# Patient Record
Sex: FEMALE | Race: WHITE | Employment: OTHER | URBAN - NONMETROPOLITAN AREA
[De-identification: names, ages, dates, MRNs, and addresses within clinical notes are randomized per-mention and may not be internally consistent; named-entity substitution may affect disease eponyms.]

---

## 2017-06-27 ENCOUNTER — DOCTOR'S OFFICE (OUTPATIENT)
Dept: URBAN - NONMETROPOLITAN AREA CLINIC 1 | Facility: CLINIC | Age: 82
Setting detail: OPHTHALMOLOGY
End: 2017-06-27
Payer: MEDICARE

## 2017-06-27 DIAGNOSIS — H02.403: ICD-10-CM

## 2017-06-27 DIAGNOSIS — Z96.1: ICD-10-CM

## 2017-06-27 DIAGNOSIS — H04.122: ICD-10-CM

## 2017-06-27 DIAGNOSIS — H01.004: ICD-10-CM

## 2017-06-27 DIAGNOSIS — H35.3131: ICD-10-CM

## 2017-06-27 DIAGNOSIS — H04.121: ICD-10-CM

## 2017-06-27 DIAGNOSIS — H01.001: ICD-10-CM

## 2017-06-27 PROCEDURE — 92014 COMPRE OPH EXAM EST PT 1/>: CPT | Performed by: OPHTHALMOLOGY

## 2017-06-27 PROCEDURE — 92134 CPTRZ OPH DX IMG PST SGM RTA: CPT | Performed by: OPHTHALMOLOGY

## 2017-06-27 PROCEDURE — 83861 MICROFLUID ANALY TEARS: CPT | Performed by: OPHTHALMOLOGY

## 2017-06-27 ASSESSMENT — REFRACTION_MANIFEST
OS_VA3: 20/
OS_VA1: 20/
OS_VA3: 20/
OS_VA1: 20/
OD_SPHERE: -0.50
OD_VA1: 20/
OD_AXIS: 095
OD_ADD: +2.75
OS_VA2: 20/
OS_VA1: 20/40
OD_VA3: 20/
OD_VA2: 20/
OS_ADD: +2.75
OS_CYLINDER: -0.50
OU_VA: 20/
OD_CYLINDER: -2.50
OD_VA1: 20/
OU_VA: 20/
OS_VA2: 20/40
OS_VA2: 20/
OD_VA1: 20/50+2
OU_VA: 20/
OS_VA3: 20/
OD_VA2: 20/40-2
OD_VA3: 20/
OD_VA2: 20/
OS_AXIS: 085
OD_VA3: 20/
OS_SPHERE: -0.75

## 2017-06-27 ASSESSMENT — REFRACTION_AUTOREFRACTION
OS_AXIS: 084
OD_AXIS: 95
OS_CYLINDER: -1.50
OD_CYLINDER: -2.50
OS_SPHERE: -1.75

## 2017-06-27 ASSESSMENT — LID EXAM ASSESSMENTS
OS_BLEPHARITIS: POSTERIOR
OS_TRICHIASIS: LLL 1+
OD_BLEPHARITIS: POSTERIOR

## 2017-06-27 ASSESSMENT — REFRACTION_CURRENTRX
OS_OVR_VA: 20/
OD_VPRISM_DIRECTION: PROGS
OD_OVR_VA: 20/
OD_CYLINDER: -2.50
OD_SPHERE: -0.50
OS_VPRISM_DIRECTION: PROGS
OS_CYLINDER: -0.50
OS_ADD: +2.75
OS_OVR_VA: 20/
OD_ADD: +2.75
OD_OVR_VA: 20/
OD_AXIS: 96
OS_SPHERE: -0.50
OD_OVR_VA: 20/
OS_OVR_VA: 20/
OS_AXIS: 89

## 2017-06-27 ASSESSMENT — DRY EYES - PHYSICIAN NOTES
OS_GENERALCOMMENTS: SEVERE KSICCA
OD_GENERALCOMMENTS: SEVERE KSICCA

## 2017-06-27 ASSESSMENT — CONFRONTATIONAL VISUAL FIELD TEST (CVF)
OS_FINDINGS: FULL
OD_FINDINGS: FULL

## 2017-06-27 ASSESSMENT — SPHEQUIV_DERIVED
OS_SPHEQUIV: -2.5
OS_SPHEQUIV: -1
OD_SPHEQUIV: -1.75

## 2017-06-27 ASSESSMENT — SUPERFICIAL PUNCTATE KERATITIS (SPK)
OS_SPK: T
OD_SPK: T

## 2017-06-27 ASSESSMENT — CORNEAL DYSTROPHY
OD_DYSTROPHY: MDF
OS_DYSTROPHY: MDF

## 2017-06-27 ASSESSMENT — VISUAL ACUITY
OS_BCVA: 20/30-1
OD_BCVA: 20/30-1

## 2017-06-27 ASSESSMENT — PUNCTA - ASSESSMENT
OD_PUNCTA: SIL PLUG RLL MED
OS_PUNCTA: SIL PLUG LLL MED

## 2018-02-21 ENCOUNTER — DOCTOR'S OFFICE (OUTPATIENT)
Dept: URBAN - NONMETROPOLITAN AREA CLINIC 1 | Facility: CLINIC | Age: 83
Setting detail: OPHTHALMOLOGY
End: 2018-02-21
Payer: MEDICARE

## 2018-02-21 ENCOUNTER — OPTICAL OFFICE (OUTPATIENT)
Dept: URBAN - NONMETROPOLITAN AREA CLINIC 4 | Facility: CLINIC | Age: 83
Setting detail: OPHTHALMOLOGY
End: 2018-02-21

## 2018-02-21 DIAGNOSIS — H02.403: ICD-10-CM

## 2018-02-21 DIAGNOSIS — H04.121: ICD-10-CM

## 2018-02-21 DIAGNOSIS — H52.223: ICD-10-CM

## 2018-02-21 DIAGNOSIS — H52.4: ICD-10-CM

## 2018-02-21 DIAGNOSIS — H04.122: ICD-10-CM

## 2018-02-21 DIAGNOSIS — H35.3131: ICD-10-CM

## 2018-02-21 DIAGNOSIS — Z96.1: ICD-10-CM

## 2018-02-21 DIAGNOSIS — H52.03: ICD-10-CM

## 2018-02-21 PROCEDURE — 92015 DETERMINE REFRACTIVE STATE: CPT | Performed by: OPTOMETRIST

## 2018-02-21 PROCEDURE — V2020 VISION SVCS FRAMES PURCHASES: HCPCS | Performed by: OPTOMETRIST

## 2018-02-21 PROCEDURE — V2104 SPHEROCYLINDR 4.00D/2.12-4D: HCPCS | Performed by: OPTOMETRIST

## 2018-02-21 PROCEDURE — 83861 MICROFLUID ANALY TEARS: CPT | Performed by: OPTOMETRIST

## 2018-02-21 PROCEDURE — 92014 COMPRE OPH EXAM EST PT 1/>: CPT | Performed by: OPTOMETRIST

## 2018-02-21 PROCEDURE — 92134 CPTRZ OPH DX IMG PST SGM RTA: CPT | Performed by: OPTOMETRIST

## 2018-02-21 ASSESSMENT — SUPERFICIAL PUNCTATE KERATITIS (SPK)
OD_SPK: T
OS_SPK: T

## 2018-02-21 ASSESSMENT — REFRACTION_CURRENTRX
OS_VPRISM_DIRECTION: PROGS
OS_OVR_VA: 20/
OS_OVR_VA: 20/
OD_VPRISM_DIRECTION: PROGS
OD_OVR_VA: 20/
OS_ADD: +2.75
OS_CYLINDER: -0.50
OS_SPHERE: -0.50
OD_ADD: +2.75
OD_OVR_VA: 20/
OD_AXIS: 96
OD_SPHERE: -0.50
OS_AXIS: 89
OD_CYLINDER: -2.50
OS_OVR_VA: 20/
OD_OVR_VA: 20/

## 2018-02-21 ASSESSMENT — REFRACTION_AUTOREFRACTION
OD_AXIS: 098
OS_SPHERE: -1.75
OD_SPHERE: +0.50
OS_AXIS: 034
OD_CYLINDER: -3.75
OS_CYLINDER: -0.75

## 2018-02-21 ASSESSMENT — SPHEQUIV_DERIVED
OD_SPHEQUIV: -1.375
OD_SPHEQUIV: -1.75
OS_SPHEQUIV: -2.125
OS_SPHEQUIV: -1

## 2018-02-21 ASSESSMENT — REFRACTION_OUTSIDERX
OD_ADD: +3.25
OS_ADD: +3.25
OS_VA2: 20/
OD_CYLINDER: -3.00
OD_AXIS: 095
OS_VA3: 20/
OS_CYLINDER: -0.50
OS_SPHERE: -0.75
OU_VA: 20/50
OD_VA2: 20/
OS_VA1: 20/50
OS_AXIS: 085
OD_VA3: 20/
OD_VA1: 20/50
OD_SPHERE: PLANO

## 2018-02-21 ASSESSMENT — REFRACTION_MANIFEST
OU_VA: 20/
OS_CYLINDER: -0.50
OS_AXIS: 085
OS_VA1: 20/40
OS_VA2: 20/40
OD_VA3: 20/
OD_ADD: +2.75
OS_VA3: 20/
OS_SPHERE: -0.75
OD_AXIS: 095
OD_VA2: 20/40-2
OS_VA2: 20/
OD_VA1: 20/
OD_CYLINDER: -2.50
OD_SPHERE: -0.50
OS_VA1: 20/
OU_VA: 20/
OS_ADD: +2.75
OS_VA3: 20/
OD_VA1: 20/50+2
OD_VA3: 20/
OD_VA2: 20/

## 2018-02-21 ASSESSMENT — PUNCTA - ASSESSMENT
OD_PUNCTA: SIL PLUG RLL
OS_PUNCTA: SIL PLUG LLL

## 2018-02-21 ASSESSMENT — DRY EYES - PHYSICIAN NOTES
OS_GENERALCOMMENTS: KSICCA
OD_GENERALCOMMENTS: KSICCA

## 2018-02-21 ASSESSMENT — VISUAL ACUITY
OD_BCVA: 20/30+1
OS_BCVA: 20/30+1

## 2018-02-21 ASSESSMENT — CONFRONTATIONAL VISUAL FIELD TEST (CVF)
OD_FINDINGS: FULL
OS_FINDINGS: FULL

## 2018-02-21 ASSESSMENT — CORNEAL DYSTROPHY
OS_DYSTROPHY: MDF
OD_DYSTROPHY: MDF

## 2018-09-25 ENCOUNTER — DOCTOR'S OFFICE (OUTPATIENT)
Dept: URBAN - NONMETROPOLITAN AREA CLINIC 1 | Facility: CLINIC | Age: 83
Setting detail: OPHTHALMOLOGY
End: 2018-09-25
Payer: MEDICARE

## 2018-09-25 DIAGNOSIS — H02.403: ICD-10-CM

## 2018-09-25 DIAGNOSIS — H35.3131: ICD-10-CM

## 2018-09-25 DIAGNOSIS — Z96.1: ICD-10-CM

## 2018-09-25 DIAGNOSIS — H04.122: ICD-10-CM

## 2018-09-25 DIAGNOSIS — H04.121: ICD-10-CM

## 2018-09-25 PROCEDURE — 83861 MICROFLUID ANALY TEARS: CPT | Performed by: OPHTHALMOLOGY

## 2018-09-25 PROCEDURE — 92134 CPTRZ OPH DX IMG PST SGM RTA: CPT | Performed by: OPHTHALMOLOGY

## 2018-09-25 PROCEDURE — 92014 COMPRE OPH EXAM EST PT 1/>: CPT | Performed by: OPHTHALMOLOGY

## 2018-09-25 ASSESSMENT — REFRACTION_MANIFEST
OD_SPHERE: PLANO
OS_VA1: 20/40
OD_ADD: +3.25
OS_VA3: 20/
OS_SPHERE: -0.75
OS_CYLINDER: -0.50
OU_VA: 20/
OD_AXIS: 095
OD_AXIS: 095
OD_VA1: 20/50
OD_VA1: 20/50+2
OU_VA: 20/50
OS_VA2: 20/40
OS_VA3: 20/
OS_VA1: 20/50
OD_CYLINDER: -3.00
OS_CYLINDER: -0.50
OD_ADD: +2.75
OD_VA2: 20/40-2
OD_SPHERE: -0.50
OS_AXIS: 085
OS_VA2: 20/
OS_ADD: +2.75
OS_SPHERE: -0.75
OD_VA3: 20/
OS_AXIS: 085
OD_VA2: 20/
OD_CYLINDER: -2.50
OS_ADD: +3.25
OD_VA3: 20/

## 2018-09-25 ASSESSMENT — VISUAL ACUITY
OD_BCVA: 20/30-2
OS_BCVA: 20/30-2

## 2018-09-25 ASSESSMENT — REFRACTION_CURRENTRX
OD_CYLINDER: -2.50
OD_OVR_VA: 20/
OS_OVR_VA: 20/
OD_OVR_VA: 20/
OD_OVR_VA: 20/
OS_OVR_VA: 20/
OS_AXIS: 89
OS_VPRISM_DIRECTION: PROGS
OS_ADD: +2.75
OD_SPHERE: -0.50
OD_VPRISM_DIRECTION: PROGS
OD_AXIS: 96
OS_OVR_VA: 20/
OS_CYLINDER: -0.50
OD_ADD: +2.75
OS_SPHERE: -0.50

## 2018-09-25 ASSESSMENT — CONFRONTATIONAL VISUAL FIELD TEST (CVF)
OD_FINDINGS: FULL
OS_FINDINGS: FULL

## 2018-09-25 ASSESSMENT — PUNCTA - ASSESSMENT
OD_PUNCTA: SIL PLUG RLL
OS_PUNCTA: SIL PLUG LLL

## 2018-09-25 ASSESSMENT — SPHEQUIV_DERIVED
OS_SPHEQUIV: -1
OS_SPHEQUIV: -1
OS_SPHEQUIV: -2.125
OD_SPHEQUIV: -1.375
OD_SPHEQUIV: -1.75

## 2018-09-25 ASSESSMENT — SUPERFICIAL PUNCTATE KERATITIS (SPK)
OS_SPK: T
OD_SPK: T

## 2018-09-25 ASSESSMENT — REFRACTION_AUTOREFRACTION
OS_CYLINDER: -0.75
OD_AXIS: 098
OD_CYLINDER: -3.75
OS_AXIS: 034
OD_SPHERE: +0.50
OS_SPHERE: -1.75

## 2018-09-25 ASSESSMENT — CORNEAL DYSTROPHY
OS_DYSTROPHY: MDF
OD_DYSTROPHY: MDF

## 2018-09-25 ASSESSMENT — DRY EYES - PHYSICIAN NOTES
OS_GENERALCOMMENTS: KSICCA
OD_GENERALCOMMENTS: KSICCA

## 2018-10-16 ENCOUNTER — DOCTOR'S OFFICE (OUTPATIENT)
Dept: URBAN - NONMETROPOLITAN AREA CLINIC 1 | Facility: CLINIC | Age: 83
Setting detail: OPHTHALMOLOGY
End: 2018-10-16

## 2018-10-16 DIAGNOSIS — H52.4: ICD-10-CM

## 2018-10-16 PROCEDURE — 92015 DETERMINE REFRACTIVE STATE: CPT | Performed by: OPTOMETRIST

## 2018-10-16 ASSESSMENT — REFRACTION_AUTOREFRACTION
OD_AXIS: 098
OD_CYLINDER: -3.75
OS_AXIS: 034
OS_CYLINDER: -0.75
OS_SPHERE: -1.75
OD_SPHERE: +0.50

## 2018-10-16 ASSESSMENT — REFRACTION_MANIFEST
OD_VA2: 20/
OD_VA3: 20/
OS_ADD: +2.75
OU_VA: 20/50
OD_SPHERE: PLANO
OU_VA: 20/
OD_VA3: 20/
OD_VA1: 20/50+2
OS_VA3: 20/
OS_VA2: 20/
OD_CYLINDER: -3.00
OS_ADD: +3.25
OD_ADD: +2.75
OS_VA1: 20/40
OS_SPHERE: -0.75
OS_VA1: 20/50
OD_VA1: 20/50
OS_AXIS: 085
OD_AXIS: 095
OS_VA3: 20/
OS_VA2: 20/40
OS_CYLINDER: -0.50
OD_VA2: 20/40-2
OD_AXIS: 095
OD_ADD: +3.25
OD_CYLINDER: -2.50
OS_AXIS: 085
OS_CYLINDER: -0.50
OS_SPHERE: -0.75
OD_SPHERE: -0.50

## 2018-10-16 ASSESSMENT — REFRACTION_CURRENTRX
OD_OVR_VA: 20/
OS_SPHERE: -0.50
OS_AXIS: 89
OD_SPHERE: -0.50
OS_ADD: +2.75
OS_OVR_VA: 20/
OD_OVR_VA: 20/
OS_OVR_VA: 20/
OD_ADD: +2.75
OS_OVR_VA: 20/
OD_CYLINDER: -2.50
OS_CYLINDER: -0.50
OS_VPRISM_DIRECTION: PROGS
OD_AXIS: 96
OD_VPRISM_DIRECTION: PROGS
OD_OVR_VA: 20/

## 2018-10-16 ASSESSMENT — SPHEQUIV_DERIVED
OD_SPHEQUIV: -1.75
OD_SPHEQUIV: -1.375
OS_SPHEQUIV: -1
OS_SPHEQUIV: -1
OS_SPHEQUIV: -2.125

## 2018-10-16 ASSESSMENT — VISUAL ACUITY
OS_BCVA: 20/30+1
OD_BCVA: 20/30

## 2019-03-27 ENCOUNTER — DOCTOR'S OFFICE (OUTPATIENT)
Dept: URBAN - NONMETROPOLITAN AREA CLINIC 1 | Facility: CLINIC | Age: 84
Setting detail: OPHTHALMOLOGY
End: 2019-03-27
Payer: MEDICARE

## 2019-03-27 DIAGNOSIS — Z96.1: ICD-10-CM

## 2019-03-27 DIAGNOSIS — H35.3131: ICD-10-CM

## 2019-03-27 DIAGNOSIS — H04.121: ICD-10-CM

## 2019-03-27 DIAGNOSIS — H02.403: ICD-10-CM

## 2019-03-27 DIAGNOSIS — H04.122: ICD-10-CM

## 2019-03-27 PROCEDURE — 83861 MICROFLUID ANALY TEARS: CPT | Performed by: OPHTHALMOLOGY

## 2019-03-27 PROCEDURE — 92014 COMPRE OPH EXAM EST PT 1/>: CPT | Performed by: OPHTHALMOLOGY

## 2019-03-27 PROCEDURE — 92134 CPTRZ OPH DX IMG PST SGM RTA: CPT | Performed by: OPHTHALMOLOGY

## 2019-03-27 ASSESSMENT — REFRACTION_MANIFEST
OS_SPHERE: -0.75
OS_VA2: 20/
OS_AXIS: 085
OD_AXIS: 095
OS_AXIS: 085
OD_VA3: 20/
OD_ADD: +3.25
OS_SPHERE: -0.75
OS_VA3: 20/
OU_VA: 20/50
OD_AXIS: 095
OD_CYLINDER: -2.50
OU_VA: 20/
OD_CYLINDER: -3.00
OD_VA1: 20/50
OD_VA2: 20/
OS_CYLINDER: -0.50
OD_SPHERE: PLANO
OS_VA2: 20/40
OS_VA1: 20/50
OS_ADD: +2.75
OD_SPHERE: -0.50
OS_ADD: +3.25
OD_ADD: +2.75
OD_VA1: 20/50+2
OS_CYLINDER: -0.50
OS_VA1: 20/40
OS_VA3: 20/
OD_VA2: 20/40-2
OD_VA3: 20/

## 2019-03-27 ASSESSMENT — REFRACTION_CURRENTRX
OD_OVR_VA: 20/
OD_OVR_VA: 20/
OD_SPHERE: -0.75
OD_ADD: +3.00
OD_OVR_VA: 20/
OS_ADD: +3.00
OD_VPRISM_DIRECTION: BF
OS_VPRISM_DIRECTION: BF
OS_OVR_VA: 20/
OS_CYLINDER: -0.50
OD_CYLINDER: -2.50
OS_AXIS: 083
OD_AXIS: 094
OS_OVR_VA: 20/
OS_OVR_VA: 20/
OS_SPHERE: -0.75

## 2019-03-27 ASSESSMENT — REFRACTION_AUTOREFRACTION
OS_CYLINDER: -0.75
OS_SPHERE: -1.75
OS_AXIS: 034
OD_SPHERE: +0.50
OD_AXIS: 098
OD_CYLINDER: -3.75

## 2019-03-27 ASSESSMENT — SPHEQUIV_DERIVED
OD_SPHEQUIV: -1.75
OS_SPHEQUIV: -2.125
OS_SPHEQUIV: -1
OD_SPHEQUIV: -1.375
OS_SPHEQUIV: -1

## 2019-03-27 ASSESSMENT — CORNEAL DYSTROPHY
OS_DYSTROPHY: MDF
OD_DYSTROPHY: MDF

## 2019-03-27 ASSESSMENT — VISUAL ACUITY
OD_BCVA: 20/30-1
OS_BCVA: 20/40-2

## 2019-03-27 ASSESSMENT — SUPERFICIAL PUNCTATE KERATITIS (SPK)
OS_SPK: T
OD_SPK: T

## 2019-03-27 ASSESSMENT — PUNCTA - ASSESSMENT
OD_PUNCTA: SIL PLUG RLL
OS_PUNCTA: SIL PLUG LLL

## 2019-03-27 ASSESSMENT — DRY EYES - PHYSICIAN NOTES
OD_GENERALCOMMENTS: KSICCA
OS_GENERALCOMMENTS: KSICCA

## 2019-09-25 ENCOUNTER — OPTICAL OFFICE (OUTPATIENT)
Dept: URBAN - NONMETROPOLITAN AREA CLINIC 4 | Facility: CLINIC | Age: 84
Setting detail: OPHTHALMOLOGY
End: 2019-09-25

## 2019-09-25 ENCOUNTER — DOCTOR'S OFFICE (OUTPATIENT)
Dept: URBAN - NONMETROPOLITAN AREA CLINIC 1 | Facility: CLINIC | Age: 84
Setting detail: OPHTHALMOLOGY
End: 2019-09-25
Payer: MEDICARE

## 2019-09-25 DIAGNOSIS — Z96.1: ICD-10-CM

## 2019-09-25 DIAGNOSIS — H35.3131: ICD-10-CM

## 2019-09-25 DIAGNOSIS — H52.223: ICD-10-CM

## 2019-09-25 DIAGNOSIS — H04.123: ICD-10-CM

## 2019-09-25 DIAGNOSIS — H02.403: ICD-10-CM

## 2019-09-25 PROCEDURE — 92134 CPTRZ OPH DX IMG PST SGM RTA: CPT | Performed by: OPHTHALMOLOGY

## 2019-09-25 PROCEDURE — 92014 COMPRE OPH EXAM EST PT 1/>: CPT | Performed by: OPHTHALMOLOGY

## 2019-09-25 PROCEDURE — V2103 SPHEROCYLINDR 4.00D/12-2.00D: HCPCS | Performed by: OPTOMETRIST

## 2019-09-25 PROCEDURE — V2020 VISION SVCS FRAMES PURCHASES: HCPCS | Performed by: OPTOMETRIST

## 2019-09-25 PROCEDURE — V2104 SPHEROCYLINDR 4.00D/2.12-4D: HCPCS | Performed by: OPTOMETRIST

## 2019-09-25 ASSESSMENT — REFRACTION_MANIFEST
OD_AXIS: 095
OS_VA2: 20/40
OS_AXIS: 085
OD_SPHERE: PLANO
OS_AXIS: 085
OD_SPHERE: -0.50
OS_VA2: 20/
OD_AXIS: 095
OS_ADD: +3.25
OS_VA3: 20/
OD_VA3: 20/
OD_VA1: 20/50+2
OS_SPHERE: -0.75
OS_VA1: 20/50
OU_VA: 20/
OS_CYLINDER: -0.50
OD_VA1: 20/50
OU_VA: 20/50
OD_CYLINDER: -3.00
OD_VA2: 20/
OD_ADD: +2.75
OD_ADD: +3.25
OD_VA3: 20/
OS_SPHERE: -0.75
OS_CYLINDER: -0.50
OD_VA2: 20/40-2
OD_CYLINDER: -2.50
OS_VA3: 20/
OS_VA1: 20/40
OS_ADD: +2.75

## 2019-09-25 ASSESSMENT — REFRACTION_CURRENTRX
OS_CYLINDER: -0.50
OS_VPRISM_DIRECTION: BF
OS_ADD: +3.25
OS_OVR_VA: 20/
OD_VPRISM_DIRECTION: BF
OD_OVR_VA: 20/
OD_SPHERE: -0.75
OS_OVR_VA: 20/
OD_CYLINDER: -2.50
OS_OVR_VA: 20/
OD_OVR_VA: 20/
OD_OVR_VA: 20/
OD_ADD: +3.25
OD_AXIS: 094
OS_AXIS: 083
OS_SPHERE: -0.75

## 2019-09-25 ASSESSMENT — DRY EYES - PHYSICIAN NOTES
OS_GENERALCOMMENTS: KSICCA
OD_GENERALCOMMENTS: KSICCA

## 2019-09-25 ASSESSMENT — PUNCTA - ASSESSMENT
OS_PUNCTA: SIL PLUG LLL
OD_PUNCTA: SIL PLUG RLL

## 2019-09-25 ASSESSMENT — SPHEQUIV_DERIVED
OD_SPHEQUIV: -1.75
OS_SPHEQUIV: -1
OS_SPHEQUIV: -1

## 2019-09-25 ASSESSMENT — CONFRONTATIONAL VISUAL FIELD TEST (CVF)
OD_FINDINGS: FULL
OS_FINDINGS: FULL

## 2019-09-25 ASSESSMENT — VISUAL ACUITY
OD_BCVA: 20/30
OS_BCVA: 20/30-1

## 2019-09-25 ASSESSMENT — SUPERFICIAL PUNCTATE KERATITIS (SPK)
OS_SPK: T
OD_SPK: T

## 2019-09-25 ASSESSMENT — CORNEAL DYSTROPHY
OS_DYSTROPHY: MDF
OD_DYSTROPHY: MDF

## 2020-05-22 ENCOUNTER — APPOINTMENT (EMERGENCY)
Dept: RADIOLOGY | Facility: HOSPITAL | Age: 85
End: 2020-05-22
Payer: MEDICARE

## 2020-05-22 ENCOUNTER — HOSPITAL ENCOUNTER (EMERGENCY)
Facility: HOSPITAL | Age: 85
Discharge: HOME | End: 2020-05-22
Attending: EMERGENCY MEDICINE | Admitting: EMERGENCY MEDICINE
Payer: MEDICARE

## 2020-05-22 ENCOUNTER — APPOINTMENT (EMERGENCY)
Dept: CT IMAGING | Facility: HOSPITAL | Age: 85
End: 2020-05-22
Payer: MEDICARE

## 2020-05-22 VITALS
RESPIRATION RATE: 20 BRPM | HEART RATE: 70 BPM | OXYGEN SATURATION: 98 % | WEIGHT: 101.41 LBS | SYSTOLIC BLOOD PRESSURE: 152 MMHG | TEMPERATURE: 96.7 F | DIASTOLIC BLOOD PRESSURE: 80 MMHG

## 2020-05-22 DIAGNOSIS — K59.00 CONSTIPATION, UNSPECIFIED CONSTIPATION TYPE: Primary | ICD-10-CM

## 2020-05-22 LAB
ALBUMIN SERPL BCP-MCNC: 3.7 G/DL (ref 3.5–5)
ALP SERPL-CCNC: 88 U/L (ref 46–116)
ALT SERPL W P-5'-P-CCNC: 21 U/L (ref 12–78)
ANION GAP SERPL CALCULATED.3IONS-SCNC: 7 MMOL/L (ref 4–13)
AST SERPL W P-5'-P-CCNC: 21 U/L (ref 5–45)
BASOPHILS # BLD AUTO: 0.06 THOUSANDS/ΜL (ref 0–0.1)
BASOPHILS NFR BLD AUTO: 1 % (ref 0–1)
BILIRUB SERPL-MCNC: 0.44 MG/DL (ref 0.2–1)
BILIRUB UR QL STRIP: NEGATIVE
BUN SERPL-MCNC: 20 MG/DL (ref 5–25)
CALCIUM SERPL-MCNC: 9.6 MG/DL (ref 8.3–10.1)
CHLORIDE SERPL-SCNC: 100 MMOL/L (ref 100–108)
CLARITY UR: CLEAR
CO2 SERPL-SCNC: 31 MMOL/L (ref 21–32)
COLOR UR: YELLOW
CREAT SERPL-MCNC: 1.01 MG/DL (ref 0.6–1.3)
EOSINOPHIL # BLD AUTO: 0.13 THOUSAND/ΜL (ref 0–0.61)
EOSINOPHIL NFR BLD AUTO: 2 % (ref 0–6)
ERYTHROCYTE [DISTWIDTH] IN BLOOD BY AUTOMATED COUNT: 13.8 % (ref 11.6–15.1)
GFR SERPL CREATININE-BSD FRML MDRD: 45 ML/MIN/1.73SQ M
GLUCOSE SERPL-MCNC: 93 MG/DL (ref 65–140)
GLUCOSE UR STRIP-MCNC: NEGATIVE MG/DL
HCT VFR BLD AUTO: 38 % (ref 34.8–46.1)
HGB BLD-MCNC: 12.4 G/DL (ref 11.5–15.4)
HGB UR QL STRIP.AUTO: NEGATIVE
IMM GRANULOCYTES # BLD AUTO: 0.02 THOUSAND/UL (ref 0–0.2)
IMM GRANULOCYTES NFR BLD AUTO: 0 % (ref 0–2)
KETONES UR STRIP-MCNC: NEGATIVE MG/DL
LACTATE SERPL-SCNC: 0.8 MMOL/L (ref 0.5–2)
LEUKOCYTE ESTERASE UR QL STRIP: NEGATIVE
LIPASE SERPL-CCNC: 112 U/L (ref 73–393)
LYMPHOCYTES # BLD AUTO: 2.03 THOUSANDS/ΜL (ref 0.6–4.47)
LYMPHOCYTES NFR BLD AUTO: 27 % (ref 14–44)
MAGNESIUM SERPL-MCNC: 2.3 MG/DL (ref 1.6–2.6)
MCH RBC QN AUTO: 32.7 PG (ref 26.8–34.3)
MCHC RBC AUTO-ENTMCNC: 32.6 G/DL (ref 31.4–37.4)
MCV RBC AUTO: 100 FL (ref 82–98)
MONOCYTES # BLD AUTO: 0.78 THOUSAND/ΜL (ref 0.17–1.22)
MONOCYTES NFR BLD AUTO: 10 % (ref 4–12)
NEUTROPHILS # BLD AUTO: 4.46 THOUSANDS/ΜL (ref 1.85–7.62)
NEUTS SEG NFR BLD AUTO: 60 % (ref 43–75)
NITRITE UR QL STRIP: NEGATIVE
NRBC BLD AUTO-RTO: 0 /100 WBCS
NT-PROBNP SERPL-MCNC: 3059 PG/ML
PH UR STRIP.AUTO: 7.5 [PH]
PLATELET # BLD AUTO: 230 THOUSANDS/UL (ref 149–390)
PMV BLD AUTO: 10.9 FL (ref 8.9–12.7)
POTASSIUM SERPL-SCNC: 4.4 MMOL/L (ref 3.5–5.3)
PROT SERPL-MCNC: 7.5 G/DL (ref 6.4–8.2)
PROT UR STRIP-MCNC: NEGATIVE MG/DL
RBC # BLD AUTO: 3.79 MILLION/UL (ref 3.81–5.12)
SARS-COV-2 RNA RESP QL NAA+PROBE: NEGATIVE
SODIUM SERPL-SCNC: 138 MMOL/L (ref 136–145)
SP GR UR STRIP.AUTO: 1.01 (ref 1–1.03)
TROPONIN I SERPL-MCNC: <0.02 NG/ML
UROBILINOGEN UR QL STRIP.AUTO: 0.2 E.U./DL
WBC # BLD AUTO: 7.48 THOUSAND/UL (ref 4.31–10.16)

## 2020-05-22 PROCEDURE — 83735 ASSAY OF MAGNESIUM: CPT | Performed by: EMERGENCY MEDICINE

## 2020-05-22 PROCEDURE — 87040 BLOOD CULTURE FOR BACTERIA: CPT | Performed by: EMERGENCY MEDICINE

## 2020-05-22 PROCEDURE — 83880 ASSAY OF NATRIURETIC PEPTIDE: CPT | Performed by: EMERGENCY MEDICINE

## 2020-05-22 PROCEDURE — 99285 EMERGENCY DEPT VISIT HI MDM: CPT

## 2020-05-22 PROCEDURE — 99284 EMERGENCY DEPT VISIT MOD MDM: CPT | Performed by: EMERGENCY MEDICINE

## 2020-05-22 PROCEDURE — 93005 ELECTROCARDIOGRAM TRACING: CPT

## 2020-05-22 PROCEDURE — 87635 SARS-COV-2 COVID-19 AMP PRB: CPT | Performed by: EMERGENCY MEDICINE

## 2020-05-22 PROCEDURE — 74176 CT ABD & PELVIS W/O CONTRAST: CPT

## 2020-05-22 PROCEDURE — 81003 URINALYSIS AUTO W/O SCOPE: CPT | Performed by: EMERGENCY MEDICINE

## 2020-05-22 PROCEDURE — 80053 COMPREHEN METABOLIC PANEL: CPT | Performed by: EMERGENCY MEDICINE

## 2020-05-22 PROCEDURE — 71045 X-RAY EXAM CHEST 1 VIEW: CPT

## 2020-05-22 PROCEDURE — 83605 ASSAY OF LACTIC ACID: CPT | Performed by: EMERGENCY MEDICINE

## 2020-05-22 PROCEDURE — 36415 COLL VENOUS BLD VENIPUNCTURE: CPT | Performed by: EMERGENCY MEDICINE

## 2020-05-22 PROCEDURE — 83690 ASSAY OF LIPASE: CPT | Performed by: EMERGENCY MEDICINE

## 2020-05-22 PROCEDURE — 96374 THER/PROPH/DIAG INJ IV PUSH: CPT

## 2020-05-22 PROCEDURE — 84484 ASSAY OF TROPONIN QUANT: CPT | Performed by: EMERGENCY MEDICINE

## 2020-05-22 PROCEDURE — 85025 COMPLETE CBC W/AUTO DIFF WBC: CPT | Performed by: EMERGENCY MEDICINE

## 2020-05-22 RX ORDER — ONDANSETRON 2 MG/ML
4 INJECTION INTRAMUSCULAR; INTRAVENOUS ONCE
Status: COMPLETED | OUTPATIENT
Start: 2020-05-22 | End: 2020-05-22

## 2020-05-22 RX ORDER — SODIUM PHOSPHATE, DIBASIC AND SODIUM PHOSPHATE, MONOBASIC 7; 19 G/133ML; G/133ML
1 ENEMA RECTAL ONCE
Status: COMPLETED | OUTPATIENT
Start: 2020-05-22 | End: 2020-05-22

## 2020-05-22 RX ADMIN — SODIUM PHOSPHATE, DIBASIC AND SODIUM PHOSPHATE, MONOBASIC 1 ENEMA: 7; 19 ENEMA RECTAL at 15:23

## 2020-05-22 RX ADMIN — ONDANSETRON 4 MG: 2 INJECTION INTRAMUSCULAR; INTRAVENOUS at 14:48

## 2020-05-26 LAB
ATRIAL RATE: 38 BPM
QRS AXIS: -78 DEGREES
QRSD INTERVAL: 178 MS
QT INTERVAL: 426 MS
QTC INTERVAL: 472 MS
T WAVE AXIS: 101 DEGREES
VENTRICULAR RATE: 74 BPM

## 2020-05-26 PROCEDURE — 93010 ELECTROCARDIOGRAM REPORT: CPT | Performed by: INTERNAL MEDICINE

## 2020-05-27 LAB
BACTERIA BLD CULT: NORMAL
BACTERIA BLD CULT: NORMAL

## 2020-07-21 ENCOUNTER — APPOINTMENT (EMERGENCY)
Dept: RADIOLOGY | Facility: HOSPITAL | Age: 85
End: 2020-07-21
Payer: MEDICARE

## 2020-07-21 ENCOUNTER — HOSPITAL ENCOUNTER (EMERGENCY)
Facility: HOSPITAL | Age: 85
Discharge: HOME | End: 2020-07-21
Attending: EMERGENCY MEDICINE | Admitting: EMERGENCY MEDICINE
Payer: MEDICARE

## 2020-07-21 ENCOUNTER — APPOINTMENT (EMERGENCY)
Dept: CT IMAGING | Facility: HOSPITAL | Age: 85
End: 2020-07-21
Payer: MEDICARE

## 2020-07-21 VITALS
SYSTOLIC BLOOD PRESSURE: 169 MMHG | OXYGEN SATURATION: 100 % | HEART RATE: 62 BPM | HEIGHT: 57 IN | TEMPERATURE: 98.3 F | RESPIRATION RATE: 24 BRPM | WEIGHT: 97.66 LBS | DIASTOLIC BLOOD PRESSURE: 81 MMHG | BODY MASS INDEX: 21.07 KG/M2

## 2020-07-21 DIAGNOSIS — H53.8 BLURRY VISION: Primary | ICD-10-CM

## 2020-07-21 DIAGNOSIS — R51.9 NONINTRACTABLE HEADACHE, UNSPECIFIED CHRONICITY PATTERN, UNSPECIFIED HEADACHE TYPE: ICD-10-CM

## 2020-07-21 LAB
ALBUMIN SERPL BCP-MCNC: 3.9 G/DL (ref 3.5–5)
ALP SERPL-CCNC: 79 U/L (ref 46–116)
ALT SERPL W P-5'-P-CCNC: 23 U/L (ref 12–78)
ANION GAP SERPL CALCULATED.3IONS-SCNC: 2 MMOL/L (ref 4–13)
AST SERPL W P-5'-P-CCNC: 39 U/L (ref 5–45)
BASOPHILS # BLD AUTO: 0.04 THOUSANDS/ΜL (ref 0–0.1)
BASOPHILS NFR BLD AUTO: 1 % (ref 0–1)
BILIRUB SERPL-MCNC: 0.47 MG/DL (ref 0.2–1)
BILIRUB UR QL STRIP: NEGATIVE
BUN SERPL-MCNC: 17 MG/DL (ref 5–25)
CALCIUM SERPL-MCNC: 10.1 MG/DL (ref 8.3–10.1)
CHLORIDE SERPL-SCNC: 102 MMOL/L (ref 100–108)
CLARITY UR: CLEAR
CO2 SERPL-SCNC: 36 MMOL/L (ref 21–32)
COLOR UR: YELLOW
CREAT SERPL-MCNC: 0.94 MG/DL (ref 0.6–1.3)
EOSINOPHIL # BLD AUTO: 0.1 THOUSAND/ΜL (ref 0–0.61)
EOSINOPHIL NFR BLD AUTO: 1 % (ref 0–6)
ERYTHROCYTE [DISTWIDTH] IN BLOOD BY AUTOMATED COUNT: 14 % (ref 11.6–15.1)
GFR SERPL CREATININE-BSD FRML MDRD: 50 ML/MIN/1.73SQ M
GLUCOSE SERPL-MCNC: 89 MG/DL (ref 65–140)
GLUCOSE UR STRIP-MCNC: NEGATIVE MG/DL
HCT VFR BLD AUTO: 38.4 % (ref 34.8–46.1)
HGB BLD-MCNC: 12.4 G/DL (ref 11.5–15.4)
HGB UR QL STRIP.AUTO: NEGATIVE
IMM GRANULOCYTES # BLD AUTO: 0.01 THOUSAND/UL (ref 0–0.2)
IMM GRANULOCYTES NFR BLD AUTO: 0 % (ref 0–2)
KETONES UR STRIP-MCNC: NEGATIVE MG/DL
LEUKOCYTE ESTERASE UR QL STRIP: NEGATIVE
LYMPHOCYTES # BLD AUTO: 1.68 THOUSANDS/ΜL (ref 0.6–4.47)
LYMPHOCYTES NFR BLD AUTO: 22 % (ref 14–44)
MAGNESIUM SERPL-MCNC: 2.3 MG/DL (ref 1.6–2.6)
MCH RBC QN AUTO: 32.8 PG (ref 26.8–34.3)
MCHC RBC AUTO-ENTMCNC: 32.3 G/DL (ref 31.4–37.4)
MCV RBC AUTO: 102 FL (ref 82–98)
MONOCYTES # BLD AUTO: 0.84 THOUSAND/ΜL (ref 0.17–1.22)
MONOCYTES NFR BLD AUTO: 11 % (ref 4–12)
NEUTROPHILS # BLD AUTO: 4.88 THOUSANDS/ΜL (ref 1.85–7.62)
NEUTS SEG NFR BLD AUTO: 65 % (ref 43–75)
NITRITE UR QL STRIP: NEGATIVE
NRBC BLD AUTO-RTO: 0 /100 WBCS
PH UR STRIP.AUTO: 7.5 [PH]
PLATELET # BLD AUTO: 216 THOUSANDS/UL (ref 149–390)
PMV BLD AUTO: 11.1 FL (ref 8.9–12.7)
POTASSIUM SERPL-SCNC: 5.2 MMOL/L (ref 3.5–5.3)
PROT SERPL-MCNC: 7.4 G/DL (ref 6.4–8.2)
PROT UR STRIP-MCNC: NEGATIVE MG/DL
RBC # BLD AUTO: 3.78 MILLION/UL (ref 3.81–5.12)
SODIUM SERPL-SCNC: 140 MMOL/L (ref 136–145)
SP GR UR STRIP.AUTO: 1.02 (ref 1–1.03)
TROPONIN I SERPL-MCNC: 0.02 NG/ML
TSH SERPL DL<=0.05 MIU/L-ACNC: 0.72 UIU/ML (ref 0.36–3.74)
UROBILINOGEN UR QL STRIP.AUTO: 0.2 E.U./DL
WBC # BLD AUTO: 7.55 THOUSAND/UL (ref 4.31–10.16)

## 2020-07-21 PROCEDURE — 70450 CT HEAD/BRAIN W/O DYE: CPT

## 2020-07-21 PROCEDURE — 80053 COMPREHEN METABOLIC PANEL: CPT | Performed by: EMERGENCY MEDICINE

## 2020-07-21 PROCEDURE — 83735 ASSAY OF MAGNESIUM: CPT | Performed by: EMERGENCY MEDICINE

## 2020-07-21 PROCEDURE — 93005 ELECTROCARDIOGRAM TRACING: CPT

## 2020-07-21 PROCEDURE — 99285 EMERGENCY DEPT VISIT HI MDM: CPT | Performed by: EMERGENCY MEDICINE

## 2020-07-21 PROCEDURE — 36415 COLL VENOUS BLD VENIPUNCTURE: CPT | Performed by: EMERGENCY MEDICINE

## 2020-07-21 PROCEDURE — 99285 EMERGENCY DEPT VISIT HI MDM: CPT

## 2020-07-21 PROCEDURE — 85025 COMPLETE CBC W/AUTO DIFF WBC: CPT | Performed by: EMERGENCY MEDICINE

## 2020-07-21 PROCEDURE — 84484 ASSAY OF TROPONIN QUANT: CPT | Performed by: EMERGENCY MEDICINE

## 2020-07-21 PROCEDURE — 81003 URINALYSIS AUTO W/O SCOPE: CPT | Performed by: EMERGENCY MEDICINE

## 2020-07-21 PROCEDURE — 82948 REAGENT STRIP/BLOOD GLUCOSE: CPT

## 2020-07-21 PROCEDURE — 71045 X-RAY EXAM CHEST 1 VIEW: CPT

## 2020-07-21 PROCEDURE — 84443 ASSAY THYROID STIM HORMONE: CPT | Performed by: EMERGENCY MEDICINE

## 2020-07-21 RX ORDER — FUROSEMIDE 40 MG/1
TABLET ORAL
COMMUNITY

## 2020-07-21 RX ORDER — UREA 10 %
4 LOTION (ML) TOPICAL
COMMUNITY

## 2020-07-21 RX ORDER — AMLODIPINE BESYLATE 2.5 MG/1
2.5 TABLET ORAL DAILY
COMMUNITY
Start: 2019-10-15 | End: 2020-10-14

## 2020-07-21 RX ORDER — SENNA LEAF EXTRACT 176MG/5ML
SYRUP ORAL
COMMUNITY
Start: 2013-12-13

## 2020-07-21 RX ORDER — FAMOTIDINE 20 MG/1
TABLET, FILM COATED ORAL
COMMUNITY
Start: 2020-02-10

## 2020-07-21 RX ORDER — CYPROHEPTADINE HYDROCHLORIDE 4 MG/1
4 TABLET ORAL 2 TIMES DAILY
COMMUNITY

## 2020-07-21 RX ORDER — ACETAMINOPHEN 325 MG/1
650 TABLET ORAL ONCE
Status: COMPLETED | OUTPATIENT
Start: 2020-07-21 | End: 2020-07-21

## 2020-07-21 RX ORDER — AMITRIPTYLINE HYDROCHLORIDE 10 MG/1
20 TABLET, FILM COATED ORAL
COMMUNITY

## 2020-07-21 RX ORDER — METOPROLOL SUCCINATE 50 MG/1
TABLET, EXTENDED RELEASE ORAL
COMMUNITY

## 2020-07-21 RX ORDER — ASPIRIN 81 MG/1
TABLET ORAL
COMMUNITY
Start: 2019-09-24

## 2020-07-21 RX ADMIN — ACETAMINOPHEN 650 MG: 325 TABLET ORAL at 14:49

## 2020-07-21 NOTE — ED NOTES
[facility]  Pt aware       Seble Francisco RN  07/21/20 9606

## 2020-07-21 NOTE — ED PROVIDER NOTES
History  Chief Complaint   Patient presents with    Blurred Vision     pt c/o blurred vision and slight headache on and off since Friday night  History is limited because the patient is a poor historian and because the nursing facility is not available to provide history  History is obtained from the patient, the ED RN who took report, and the EMS  As best as I can tell, the patient complains of 4 days of headache and intermittent blurry vision  She denies focal weakness  She denies speech problems  She denies nausea or vomiting  She denies chest or abdominal pain  Apparently the patient has a prior history of intracranial hemorrhage and so the nursing facility became concerned when she complained of headache and blurred vision and sent the patient for emergency room of evaluation  At the time that the patient arrives in the emergency department, she complains of mild frontal headache but denies any blurred vision  Patient also complains of hearing auditory hallucinations in the left ear  This has been ongoing for weeks  She has been evaluated by ENT according to the report received  No other complaints  History provided by:  Patient and EMS personnel  History limited by: Poor historian     used: No    Headache   Pain location:  Frontal  Quality:  Dull  Radiates to:  Does not radiate  Severity currently:  1/10  Onset quality:  Gradual  Timing:  Intermittent  Progression:  Waxing and waning  Chronicity:  Recurrent  Similar to prior headaches: yes    Relieved by:  Nothing  Worsened by:  Nothing  Ineffective treatments:  None tried  Associated symptoms: blurred vision    Associated symptoms: no abdominal pain, no congestion, no cough, no diarrhea, no dizziness, no drainage, no ear pain, no eye pain, no facial pain, no fever, no hearing loss, no loss of balance, no myalgias, no nausea, no near-syncope, no neck pain, no neck stiffness, no numbness, no paresthesias, no photophobia, no seizures, no sore throat, no syncope, no tingling, no URI, no vomiting and no weakness        Prior to Admission Medications   Prescriptions Last Dose Informant Patient Reported? Taking? Cholecalciferol (VITAMIN D-3) 125 MCG (5000 UT) TABS   Yes No   Sig: Take by mouth   amLODIPine (NORVASC) 2 5 mg tablet   Yes Yes   Sig: Take 2 5 mg by mouth daily   amitriptyline (ELAVIL) 10 mg tablet   Yes No   Sig: Take 20 mg by mouth   aspirin (Aspir-Low) 81 mg EC tablet   Yes Yes   Sig: Take by mouth   bisacodyl (DULCOLAX) 5 mg EC tablet   Yes No   Sig: Take 5 mg by mouth   cyproheptadine (PERIACTIN) 4 mg tablet   Yes No   Sig: Take 4 mg by mouth 2 (two) times a day   famotidine (PEPCID) 20 mg tablet   Yes Yes   furosemide (LASIX) 40 mg tablet   Yes No   Sig: Take by mouth   melatonin 1 mg   Yes No   Sig: Take 4 mg by mouth   metoprolol succinate (TOPROL-XL) 50 mg 24 hr tablet   Yes No   Sig: Take by mouth   polyethylene glycol-propylene glycol (SYSTANE) 0 4-0 3 %   Yes No   Sig: Apply to eye 4 (four) times a day as needed   senna 176 mg/5 mL   Yes Yes   Sig: Take by mouth      Facility-Administered Medications: None       Past Medical History:   Diagnosis Date    [facility] )     ASCVD (arteriosclerotic cardiovascular disease)     Coronary artery disease     Hypertension     PVD (peripheral vascular disease) ([place] [id number] )        History reviewed  No pertinent surgical history  History reviewed  No pertinent family history  I have reviewed and agree with the history as documented  E-Cigarette/Vaping    E-Cigarette Use Never User      E-Cigarette/Vaping Substances     Social History     Tobacco Use    Smoking status: Never Smoker    Smokeless tobacco: Never Used   Substance Use Topics    Alcohol use: Never     Frequency: Never    Drug use: Never       Review of Systems   Constitutional: Negative for chills and fever     HENT: Negative for congestion, ear pain, hearing loss, postnasal drip, sore throat, trouble swallowing and voice change  Eyes: Positive for blurred vision  Negative for photophobia, pain and discharge  Respiratory: Negative for cough, shortness of breath and wheezing  Cardiovascular: Negative for chest pain, palpitations, syncope and near-syncope  Gastrointestinal: Negative for abdominal pain, blood in stool, constipation, diarrhea, nausea and vomiting  Genitourinary: Negative for dysuria, flank pain, frequency and hematuria  Musculoskeletal: Negative for joint swelling, myalgias, neck pain and neck stiffness  Skin: Negative for rash and wound  Neurological: Positive for headaches  Negative for dizziness, seizures, syncope, facial asymmetry, weakness, numbness, paresthesias and loss of balance  Psychiatric/Behavioral: Negative for hallucinations, self-injury and suicidal ideas  All other systems reviewed and are negative  Physical Exam  Physical Exam   Constitutional: She is oriented to person, place, and time  She appears well-developed and well-nourished  No distress  HENT:   Head: Normocephalic and atraumatic  Right Ear: External ear normal    Left Ear: External ear normal    Eyes: Pupils are equal, round, and reactive to light  Conjunctivae and EOM are normal    Neck: Normal range of motion  Neck supple  Cardiovascular: Normal rate, regular rhythm and normal heart sounds  No murmur heard  Pulmonary/Chest: Effort normal and breath sounds normal  She has no wheezes  She has no rales  Abdominal: Soft  Bowel sounds are normal  She exhibits no distension  There is no tenderness  There is no rebound and no guarding  Musculoskeletal: Normal range of motion  She exhibits no deformity  Neurological: She is alert and oriented to person, place, and time  No cranial nerve deficit     Awake and alert and oriented x4  Cranial nerves 2-12 normal without nystagmus  Speech fluent without receptive or expressive aphasia  No focal motor deficits  Finger-to-nose normal  NIH stroke scale score 0   Skin: Skin is warm and dry  No rash noted  Psychiatric: She has a normal mood and affect  Her behavior is normal    Nursing note and vitals reviewed        Vital Signs  ED Triage Vitals   Temperature Pulse Respirations Blood Pressure SpO2   07/21/20 1143 07/21/20 1143 07/21/20 1143 07/21/20 1143 07/21/20 1143   98 3 °F (36 8 °C) 74 18 (!) 175/88 100 %      Temp Source Heart Rate Source Patient Position - Orthostatic VS BP Location FiO2 (%)   07/21/20 1143 07/21/20 1143 07/21/20 1143 07/21/20 1143 --   Temporal Monitor Lying Right arm       Pain Score       07/21/20 1449       6           Vitals:    07/21/20 1315 07/21/20 1400 07/21/20 1421 07/21/20 1430   BP:  155/80 136/70 (!) 159/106   Pulse: 70 60 62 74   Patient Position - Orthostatic VS:   Lying          Visual Acuity      ED Medications  Medications   acetaminophen (TYLENOL) tablet 650 mg (650 mg Oral Given 7/21/20 1449)       Diagnostic Studies  Results Reviewed     Procedure Component Value Units Date/Time    UA w Reflex to Microscopic w Reflex to Culture [[id number]] Collected:  07/21/20 1420    Lab Status:  Final result Specimen:  Urine, Clean Catch Updated:  07/21/20 1427     Color, UA Yellow     Clarity, UA Clear     Specific [place], UA 1 020     pH, UA 7 5     Leukocytes, UA Negative     Nitrite, UA Negative     Protein, UA Negative mg/dl      Glucose, UA Negative mg/dl      Ketones, UA Negative mg/dl      Urobilinogen, UA 0 2 E U /dl      Bilirubin, UA Negative     Blood, UA Negative    Comprehensive metabolic panel [[id number]]  (Abnormal) Collected:  07/21/20 1210    Lab Status:  Final result Specimen:  Blood from Arm, Right Updated:  07/21/20 1249     Sodium 140 mmol/L      Potassium 5 2 mmol/L      Chloride 102 mmol/L      CO2 36 mmol/L      ANION GAP 2 mmol/L      BUN 17 mg/dL      Creatinine 0 94 mg/dL      Glucose 89 mg/dL      Calcium 10 1 mg/dL      AST 39 U/L      ALT 23 U/L      Alkaline Phosphatase 79 U/L      Total Protein 7 4 g/dL      Albumin 3 9 g/dL      Total Bilirubin 0 47 mg/dL      eGFR 50 ml/min/1 73sq m     Narrative:       [place] guidelines for Chronic Kidney Disease (CKD):     Stage 1 with normal or high GFR (GFR > 90 mL/min/1 73 square meters)    Stage 2 Mild CKD (GFR = 60-89 mL/min/1 73 square meters)    Stage 3A Moderate CKD (GFR = 45-59 mL/min/1 73 square meters)    Stage 3B Moderate CKD (GFR = 30-44 mL/min/1 73 square meters)    Stage 4 Severe CKD (GFR = 15-29 mL/min/1 73 square meters)    Stage 5 End Stage CKD (GFR <15 mL/min/1 73 square meters)  Note: GFR calculation is accurate only with a steady state creatinine    TSH, 3rd generation with Free T4 reflex [[id number]]  (Normal) Collected:  07/21/20 1210    Lab Status:  Final result Specimen:  Blood from Arm, Right Updated:  07/21/20 1249     TSH 3RD GENERATON 0 721 uIU/mL     Narrative:       Patients undergoing fluorescein dye angiography may retain small amounts of fluorescein in the body for 48-72 hours post procedure  Samples containing fluorescein can produce falsely depressed TSH values  If the patient had this procedure,a specimen should be resubmitted post fluorescein clearance        Magnesium [[id number]]  (Normal) Collected:  07/21/20 1210    Lab Status:  Final result Specimen:  Blood from Arm, Right Updated:  07/21/20 1249     Magnesium 2 3 mg/dL     Troponin I [[id number]]  (Normal) Collected:  07/21/20 1210    Lab Status:  Final result Specimen:  Blood from Arm, Right Updated:  07/21/20 1244     Troponin I 0 02 ng/mL     CBC and differential [[id number]]  (Abnormal) Collected:  07/21/20 1210    Lab Status:  Final result Specimen:  Blood from Arm, Right Updated:  07/21/20 1219     WBC 7 55 Thousand/uL      RBC 3 78 Million/uL      Hemoglobin 12 4 g/dL      Hematocrit 38 4 %       fL      MCH 32 8 pg      MCHC 32 3 g/dL      RDW 14 0 %      MPV 11 1 fL      Platelets 174 Thousands/uL      nRBC 0 /100 WBCs Neutrophils Relative 65 %      Immat GRANS % 0 %      Lymphocytes Relative 22 %      Monocytes Relative 11 %      Eosinophils Relative 1 %      Basophils Relative 1 %      Neutrophils Absolute 4 88 Thousands/µL      Immature Grans Absolute 0 01 Thousand/uL      Lymphocytes Absolute 1 68 Thousands/µL      Monocytes Absolute 0 84 Thousand/µL      Eosinophils Absolute 0 10 Thousand/µL      Basophils Absolute 0 04 Thousands/µL                  XR chest portable   Final Result by Arty Dandy, MD (07/21 1251)      No acute cardiopulmonary disease  Workstation performed: [id number]         CT head wo contrast   Final Result by Ashley Cobb MD (07/21 1202)      No acute intracranial abnormality  Workstation performed: [id number]                    Procedures  ECG 12 Lead Documentation Only  Date/Time: 7/21/2020 12:19 PM  Performed by: Yury Enciso MD  Authorized by: Yury Enciso MD     ECG reviewed by me, the ED Provider: yes    Patient location:  ED  Previous ECG:     Previous ECG:  Compared to current    Similarity:  No change  Interpretation:     Interpretation: abnormal    Rate:     ECG rate:  74    ECG rate assessment: normal    Rhythm:     Rhythm: paced    Ectopy:     Ectopy: PVCs      Ectopy comment:  With every paced beat there is 1 PVC  QRS:     QRS axis:  Normal    QRS intervals:  Normal  Conduction:     Conduction: normal    ST segments:     ST segments:  Normal  T waves:     T waves: normal               ED Course  ED Course as of Jul 21 1454   Tue Jul 21, 2020   1439 Attempted to call nursing facility where patient resides but I have received no return call  Patient remained stable throughout her stay in the emergency department  Her head CT is negative  Her urinalysis and blood work are negative  Chest x-ray is negative  Patient's neurologic exam remains normal   Patient's symptoms appear to be chronic  Stable for discharge at this time            US AUDIT Most Recent Value   Initial Alcohol Screen: US AUDIT-C    1  How often do you have a drink containing alcohol?  0 Filed at: 07/21/2020 1300   Audit-C Score  0 Filed at: 07/21/2020 1300                Stroke Assessment     Row Name 07/21/20 1138             NIH Stroke Scale    Interval  Baseline      Level of Consciousness (1a )  0      LOC Questions (1b )  0      LOC Commands (1c )  0      Best Gaze (2 )  0      Visual (3 )  0      Facial Palsy (4 )  0      Motor Arm, Left (5a )  0      Motor Arm, Right (5b )  0      Motor Leg, Left (6a )  0      Motor Leg, Right (6b )  0      Limb Ataxia (7 )  0      Sensory (8 )  0      Best Language (9 )  0      Dysarthria (10 )  0      Extinction and Inattention (11 ) (Formerly Neglect)  0      Total  0          First Filed Value   TPA Decision  Patient not a TPA candidate  Patient is not a candidate options  Unclear time of onset outside appropriate time window  MAE/DAST-10      Most Recent Value   How many times in the past year have you    Used an illegal drug or used a prescription medication for non-medical reasons? Never Filed at: 07/21/2020 1259                                MDM  Number of Diagnoses or Management Options  Blurry vision:   Nonintractable headache, unspecified chronicity pattern, unspecified headache type:   Diagnosis management comments: No stroke alert is called because the patient has been having symptoms for at least 4 days  She has an NIH stroke scale score of 0 and she is neurologically intact  Symptoms unlikely to represent stroke under the circumstances  Emergent head CT obtained in any case         Amount and/or Complexity of Data Reviewed  Clinical lab tests: ordered and reviewed  Tests in the radiology section of CPT®: ordered and reviewed  Decide to obtain previous medical records or to obtain history from someone other than the patient: yes  Review and summarize past medical records: yes  Independent visualization of images, tracings, or specimens: yes          Disposition  Final diagnoses:   Blurry vision   Nonintractable headache, unspecified chronicity pattern, unspecified headache type     Time reflects when diagnosis was documented in both MDM as applicable and the Disposition within this note     Time User Action Codes Description Comment    7/21/2020  2:40 PM Chad Robbins Add [H53 8] Blurry vision     7/21/2020  2:40 PM Chad Robbins Add [R51] Nonintractable headache, unspecified chronicity pattern, unspecified headache type       ED Disposition     ED Disposition Condition Date/Time Comment    Discharge Stable Tue Jul 21, 2020  2:40 PM Ana Uriostegui discharge to home/self care  Follow-up Information     Follow up With Specialties Details Why Contact Info    Chas Mora, DO Family Medicine  As needed [address] [facility]  [address]  [phone]      [facility] Ophthalmology  As needed [address] [place] [address] Olivier Delgado MD Neurology  As needed [address]  [address]  [phone]            Patient's Medications   Discharge Prescriptions    No medications on file     No discharge procedures on file      PDMP Review     None          ED Provider  Electronically Signed by           Krystal Mei MD  07/21/20 7490

## 2020-07-21 NOTE — ED NOTES
Notified Jose Lu that pt is to be discharged, they have nopreference as to transportation  Called [facility], they will call back with MARINA Garcia RN  07/21/20 6208

## 2020-07-23 LAB
ATRIAL RATE: 74 BPM
QRS AXIS: 93 DEGREES
QRSD INTERVAL: 126 MS
QT INTERVAL: 444 MS
QTC INTERVAL: 492 MS
T WAVE AXIS: -89 DEGREES
VENTRICULAR RATE: 74 BPM

## 2020-07-23 PROCEDURE — 93010 ELECTROCARDIOGRAM REPORT: CPT | Performed by: INTERNAL MEDICINE

## 2020-07-24 LAB — GLUCOSE SERPL-MCNC: 111 MG/DL (ref 65–140)

## 2020-08-19 ENCOUNTER — RX ONLY (RX ONLY)
Age: 85
End: 2020-08-19

## 2020-08-19 ENCOUNTER — DOCTOR'S OFFICE (OUTPATIENT)
Dept: URBAN - NONMETROPOLITAN AREA CLINIC 1 | Facility: CLINIC | Age: 85
Setting detail: OPHTHALMOLOGY
End: 2020-08-19
Payer: MEDICARE

## 2020-08-19 DIAGNOSIS — Z96.1: ICD-10-CM

## 2020-08-19 DIAGNOSIS — H35.3131: ICD-10-CM

## 2020-08-19 DIAGNOSIS — H47.013: ICD-10-CM

## 2020-08-19 DIAGNOSIS — H04.123: ICD-10-CM

## 2020-08-19 PROCEDURE — 92014 COMPRE OPH EXAM EST PT 1/>: CPT | Performed by: OPHTHALMOLOGY

## 2020-08-19 ASSESSMENT — PUNCTA - ASSESSMENT
OS_PUNCTA: SIL PLUG LLL
OD_PUNCTA: SIL PLUG RLL

## 2020-08-19 ASSESSMENT — REFRACTION_MANIFEST
OS_ADD: +3.25
OD_CYLINDER: -2.50
OS_CYLINDER: -0.50
OS_ADD: +2.75
OD_SPHERE: -0.50
OS_SPHERE: -0.75
OD_VA1: 20/50+2
OS_VA2: 20/40
OS_VA1: 20/40
OD_SPHERE: PLANO
OD_VA1: 20/50
OD_ADD: +2.75
OD_AXIS: 095
OS_AXIS: 085
OD_ADD: +3.25
OS_VA1: 20/50
OS_AXIS: 085
OD_AXIS: 095
OD_CYLINDER: -3.00
OS_CYLINDER: -0.50
OU_VA: 20/50
OS_SPHERE: -0.75
OD_VA2: 20/40-2

## 2020-08-19 ASSESSMENT — DRY EYES - PHYSICIAN NOTES
OS_GENERALCOMMENTS: KSICCA
OD_GENERALCOMMENTS: KSICCA

## 2020-08-19 ASSESSMENT — REFRACTION_CURRENTRX
OS_SPHERE: -0.75
OD_OVR_VA: 20/
OD_AXIS: 094
OD_SPHERE: -0.75
OD_ADD: +3.25
OD_CYLINDER: -2.50
OS_VPRISM_DIRECTION: BF
OS_OVR_VA: 20/
OS_AXIS: 083
OD_VPRISM_DIRECTION: BF
OS_CYLINDER: -0.50
OS_ADD: +3.25

## 2020-08-19 ASSESSMENT — SPHEQUIV_DERIVED
OD_SPHEQUIV: -1.75
OS_SPHEQUIV: -1
OS_SPHEQUIV: -1

## 2020-08-19 ASSESSMENT — VISUAL ACUITY
OS_BCVA: 20/50-1
OD_BCVA: 20/30

## 2020-08-19 ASSESSMENT — CORNEAL DYSTROPHY
OD_DYSTROPHY: MDF
OS_DYSTROPHY: MDF

## 2020-08-19 ASSESSMENT — SUPERFICIAL PUNCTATE KERATITIS (SPK)
OS_SPK: T 1+
OD_SPK: T 1+

## 2020-08-19 ASSESSMENT — CONFRONTATIONAL VISUAL FIELD TEST (CVF)
OS_FINDINGS: FULL
OD_FINDINGS: FULL

## 2020-10-06 ENCOUNTER — OPTICAL OFFICE (OUTPATIENT)
Dept: URBAN - NONMETROPOLITAN AREA CLINIC 4 | Facility: CLINIC | Age: 85
Setting detail: OPHTHALMOLOGY
End: 2020-10-06

## 2020-10-06 ENCOUNTER — RX ONLY (RX ONLY)
Age: 85
End: 2020-10-06

## 2020-10-06 ENCOUNTER — DOCTOR'S OFFICE (OUTPATIENT)
Dept: URBAN - NONMETROPOLITAN AREA CLINIC 1 | Facility: CLINIC | Age: 85
Setting detail: OPHTHALMOLOGY
End: 2020-10-06

## 2020-10-06 DIAGNOSIS — H52.223: ICD-10-CM

## 2020-10-06 DIAGNOSIS — H52.4: ICD-10-CM

## 2020-10-06 DIAGNOSIS — H52.03: ICD-10-CM

## 2020-10-06 PROCEDURE — 92015 DETERMINE REFRACTIVE STATE: CPT | Performed by: OPTOMETRIST

## 2020-10-06 PROCEDURE — V2020 VISION SVCS FRAMES PURCHASES: HCPCS | Performed by: OPTOMETRIST

## 2020-10-06 PROCEDURE — V2203 LENS SPHCYL BIFOCAL 4.00D/.1: HCPCS | Performed by: OPTOMETRIST

## 2020-10-06 PROCEDURE — V2204 LENS SPHCY BIFOCAL 4.00D/2.1: HCPCS | Performed by: OPTOMETRIST

## 2020-10-06 ASSESSMENT — REFRACTION_MANIFEST
OD_AXIS: 095
OD_SPHERE: PLANO
OU_VA: 20/50
OS_VA1: 20/50
OD_CYLINDER: -3.00
OS_ADD: +3.25
OD_ADD: +3.25
OD_VA1: 20/50
OS_AXIS: 085
OS_CYLINDER: -0.50
OS_SPHERE: -0.75
OD_VA1: 20/50-2
OS_SPHERE: -0.75
OS_VA1: 20/50+2
OD_AXIS: 095
OS_ADD: +3.25
OD_CYLINDER: -3.00
OD_VA2: 20/50-2
OD_ADD: +3.25
OS_AXIS: 085
OD_SPHERE: PLANO
OS_CYLINDER: -0.50
OS_VA2: 20/50+2

## 2020-10-06 ASSESSMENT — REFRACTION_CURRENTRX
OD_ADD: +3.25
OD_VPRISM_DIRECTION: BF
OS_AXIS: 083
OS_OVR_VA: 20/
OD_CYLINDER: -2.50
OS_ADD: +3.25
OD_AXIS: 094
OS_VPRISM_DIRECTION: BF
OD_SPHERE: -0.75
OD_OVR_VA: 20/
OS_CYLINDER: -0.50
OS_SPHERE: -0.75

## 2020-10-06 ASSESSMENT — VISUAL ACUITY
OD_BCVA: 20/50+2
OS_BCVA: 20/60-2

## 2020-10-06 ASSESSMENT — SPHEQUIV_DERIVED
OS_SPHEQUIV: -1
OS_SPHEQUIV: -1

## 2021-01-01 ENCOUNTER — DOCTOR'S OFFICE (OUTPATIENT)
Dept: URBAN - NONMETROPOLITAN AREA CLINIC 1 | Facility: CLINIC | Age: 86
Setting detail: OPHTHALMOLOGY
End: 2021-01-01
Payer: MEDICARE

## 2021-01-01 DIAGNOSIS — H35.3131: ICD-10-CM

## 2021-01-01 DIAGNOSIS — Z96.1: ICD-10-CM

## 2021-01-01 DIAGNOSIS — H49.12: ICD-10-CM

## 2021-01-01 DIAGNOSIS — H50.10: ICD-10-CM

## 2021-01-01 DIAGNOSIS — H53.2: ICD-10-CM

## 2021-01-01 DIAGNOSIS — H47.013: ICD-10-CM

## 2021-01-01 DIAGNOSIS — H04.122: ICD-10-CM

## 2021-01-01 DIAGNOSIS — H04.121: ICD-10-CM

## 2021-01-01 DIAGNOSIS — H04.123: ICD-10-CM

## 2021-01-01 DIAGNOSIS — H02.403: ICD-10-CM

## 2021-01-01 PROCEDURE — 92134 CPTRZ OPH DX IMG PST SGM RTA: CPT | Performed by: OPHTHALMOLOGY

## 2021-01-01 PROCEDURE — 99213 OFFICE O/P EST LOW 20 MIN: CPT | Performed by: OPHTHALMOLOGY

## 2021-01-01 PROCEDURE — 92012 INTRM OPH EXAM EST PATIENT: CPT | Performed by: OPHTHALMOLOGY

## 2021-01-01 PROCEDURE — 92014 COMPRE OPH EXAM EST PT 1/>: CPT | Performed by: OPHTHALMOLOGY

## 2021-01-01 PROCEDURE — 92060 SENSORIMOTOR EXAMINATION: CPT | Performed by: OPHTHALMOLOGY

## 2021-01-01 ASSESSMENT — REFRACTION_MANIFEST
OS_SPHERE: -0.75
OS_VA1: 20/50+2
OD_SPHERE: PLANO
OS_SPHERE: -0.75
OS_AXIS: 085
OD_VA1: 20/50-2
OD_VA2: 20/50-2
OD_VA2: 20/50-2
OS_VA1: 20/50
OD_VA1: 20/50
OD_CYLINDER: -3.00
OD_AXIS: 095
OD_CYLINDER: -3.00
OD_AXIS: 095
OS_VA2: 20/50+2
OS_AXIS: 085
OD_SPHERE: PLANO
OS_ADD: +3.25
OD_SPHERE: PLANO
OS_CYLINDER: -0.50
OD_VA2: 20/50-2
OD_AXIS: 095
OD_VA1: 20/50
OD_ADD: +3.25
OS_CYLINDER: -0.50
OS_ADD: +3.25
OD_VA2: 20/50-2
OS_SPHERE: -0.75
OS_VA2: 20/50+2
OS_AXIS: 085
OD_CYLINDER: -3.00
OS_ADD: +3.25
OD_ADD: +3.25
OD_SPHERE: PLANO
OD_CYLINDER: -3.00
OS_AXIS: 085
OD_SPHERE: PLANO
OD_ADD: +3.25
OS_SPHERE: -0.75
OD_VA1: 20/50-2
OD_SPHERE: PLANO
OD_AXIS: 095
OS_AXIS: 085
OS_ADD: +3.25
OD_SPHERE: PLANO
OD_ADD: +3.25
OD_CYLINDER: -3.00
OD_SPHERE: PLANO
OD_VA1: 20/50
OD_AXIS: 095
OD_VA2: 20/50-2
OS_ADD: +3.25
OS_CYLINDER: -0.50
OS_CYLINDER: -0.50
OS_SPHERE: -0.75
OS_VA1: 20/50
OU_VA: 20/50
OS_CYLINDER: -0.50
OS_CYLINDER: -0.50
OS_VA2: 20/50+2
OD_VA1: 20/50-2
OD_AXIS: 095
OD_AXIS: 095
OS_VA1: 20/50+2
OS_CYLINDER: -0.50
OD_VA1: 20/50
OS_VA1: 20/50+2
OS_CYLINDER: -0.50
OS_SPHERE: -0.75
OD_AXIS: 095
OD_CYLINDER: -3.00
OD_ADD: +3.25
OS_SPHERE: -0.75
OD_CYLINDER: -3.00
OS_SPHERE: -0.75
OU_VA: 20/50
OS_ADD: +3.25
OS_VA2: 20/50+2
OS_SPHERE: -0.75
OS_VA2: 20/50+2
OD_SPHERE: PLANO
OU_VA: 20/50
OS_VA1: 20/50+2
OS_ADD: +3.25
OD_VA1: 20/50
OS_SPHERE: -0.75
OD_VA1: 20/50-2
OD_CYLINDER: -3.00
OS_VA1: 20/50
OS_ADD: +3.25
OD_ADD: +3.25
OD_VA1: 20/50-2
OU_VA: 20/50
OS_SPHERE: -0.75
OS_AXIS: 085
OD_ADD: +3.25
OS_ADD: +3.25
OD_ADD: +3.25
OD_SPHERE: PLANO
OS_VA2: 20/50+2
OD_VA1: 20/50-2
OU_VA: 20/50
OD_ADD: +3.25
OS_VA1: 20/50+2
OS_SPHERE: -0.75
OD_ADD: +3.25
OS_CYLINDER: -0.50
OD_SPHERE: PLANO
OD_VA2: 20/50-2
OS_VA1: 20/50
OD_AXIS: 095
OS_AXIS: 085
OS_VA1: 20/50
OD_ADD: +3.25
OS_AXIS: 085
OD_CYLINDER: -3.00
OS_ADD: +3.25
OD_CYLINDER: -3.00
OS_ADD: +3.25
OS_VA1: 20/50+2
OD_AXIS: 095
OS_CYLINDER: -0.50
OD_CYLINDER: -3.00
OS_CYLINDER: -0.50
OS_ADD: +3.25
OS_CYLINDER: -0.50
OD_ADD: +3.25
OS_AXIS: 085
OD_AXIS: 095
OD_SPHERE: PLANO
OS_AXIS: 085
OD_CYLINDER: -3.00
OS_AXIS: 085
OS_VA1: 20/50
OU_VA: 20/50
OD_AXIS: 095
OD_VA1: 20/50
OS_AXIS: 085

## 2021-01-01 ASSESSMENT — REFRACTION_CURRENTRX
OD_OVR_VA: 20/
OD_SPHERE: PLANO
OD_OVR_VA: 20/
OS_SPHERE: -0.75
OS_OVR_VA: 20/
OD_CYLINDER: -3.00
OD_OVR_VA: 20/
OS_CYLINDER: -0.50
OD_AXIS: 99
OS_OVR_VA: 20/
OD_AXIS: 093
OD_CYLINDER: -3.00
OD_AXIS: 99
OD_AXIS: 99
OS_ADD: +3.50
OS_ADD: +3.50
OS_CYLINDER: -0.50
OD_SPHERE: PLANO
OS_ADD: +3.50
OD_SPHERE: PLANO
OD_ADD: +3.50
OS_AXIS: 82
OD_OVR_VA: 20/
OS_CYLINDER: -0.50
OD_ADD: +3.50
OS_AXIS: 078
OD_AXIS: 99
OD_VPRISM_DIRECTION: BF
OS_VPRISM_DIRECTION: BF
OD_AXIS: 093
OS_AXIS: 82
OD_ADD: +3.50
OD_VPRISM_DIRECTION: BF
OD_CYLINDER: -3.00
OD_VPRISM_DIRECTION: BF
OS_OVR_VA: 20/
OD_SPHERE: PLANO
OD_VPRISM_DIRECTION: BF
OS_VPRISM_DIRECTION: BF
OD_CYLINDER: -3.00
OS_OVR_VA: 20/
OS_SPHERE: -0.75
OS_CYLINDER: -0.50
OD_ADD: +3.50
OD_OVR_VA: 20/
OS_AXIS: 82
OS_ADD: +3.50
OS_SPHERE: -0.75
OS_SPHERE: -0.75
OD_VPRISM_DIRECTION: BF
OD_CYLINDER: -3.00
OS_CYLINDER: -0.50
OS_AXIS: 82
OS_SPHERE: -0.75
OD_ADD: +3.50
OD_SPHERE: PLANO
OD_ADD: +3.50
OD_VPRISM_DIRECTION: BF
OD_SPHERE: PLANO
OS_ADD: +3.50
OS_VPRISM_DIRECTION: BF
OS_VPRISM_DIRECTION: BF
OD_OVR_VA: 20/
OS_OVR_VA: 20/
OS_SPHERE: -0.75
OD_CYLINDER: -3.00
OS_VPRISM_DIRECTION: BF
OS_OVR_VA: 20/
OS_VPRISM_DIRECTION: BF
OS_CYLINDER: -0.50
OS_ADD: +3.50
OS_AXIS: 078

## 2021-01-01 ASSESSMENT — PUNCTA - ASSESSMENT
OD_PUNCTA: SIL PLUG RLL
OS_PUNCTA: SIL PLUG LLL
OS_PUNCTA: SIL PLUG LLL
OD_PUNCTA: SIL PLUG RLL
OD_PUNCTA: SIL PLUG RLL
OS_PUNCTA: SIL PLUG LLL
OD_PUNCTA: SIL PLUG RLL
OD_PUNCTA: SIL PLUG RLL
OS_PUNCTA: SIL PLUG LLL
OD_PUNCTA: SIL PLUG RLL
OS_PUNCTA: SIL PLUG LLL
OS_PUNCTA: SIL PLUG LLL

## 2021-01-01 ASSESSMENT — VISUAL ACUITY
OD_BCVA: 20/40
OD_BCVA: 20/60
OS_BCVA: 20/40-2
OS_BCVA: 20/30-2
OS_BCVA: 20/60
OD_BCVA: 20/30
OS_BCVA: 20/50
OD_BCVA: 20/40
OD_BCVA: 20/40
OS_BCVA: 20/40
OD_BCVA: 20/30-2
OS_BCVA: 20/40

## 2021-01-01 ASSESSMENT — DRY EYES - PHYSICIAN NOTES
OD_GENERALCOMMENTS: KSICCA
OD_GENERALCOMMENTS: KSICCA
OS_GENERALCOMMENTS: KSICCA
OD_GENERALCOMMENTS: KSICCA
OD_GENERALCOMMENTS: KSICCA
OS_GENERALCOMMENTS: KSICCA
OD_GENERALCOMMENTS: KSICCA
OS_GENERALCOMMENTS: KSICCA
OS_GENERALCOMMENTS: KSICCA
OD_GENERALCOMMENTS: KSICCA

## 2021-01-01 ASSESSMENT — CONFRONTATIONAL VISUAL FIELD TEST (CVF)
OS_FINDINGS: FULL
OD_FINDINGS: FULL
OS_FINDINGS: FULL
OD_FINDINGS: FULL
OD_FINDINGS: FULL
OS_FINDINGS: FULL

## 2021-01-01 ASSESSMENT — CORNEAL DYSTROPHY
OS_DYSTROPHY: MDF
OD_DYSTROPHY: MDF
OS_DYSTROPHY: MDF
OD_DYSTROPHY: MDF
OS_DYSTROPHY: MDF
OD_DYSTROPHY: MDF
OS_DYSTROPHY: MDF
OD_DYSTROPHY: MDF

## 2021-01-01 ASSESSMENT — SPHEQUIV_DERIVED
OS_SPHEQUIV: -1
OS_SPHEQUIV: -1.75
OS_SPHEQUIV: -1
OS_SPHEQUIV: -1
OD_SPHEQUIV: -2.125

## 2021-01-01 ASSESSMENT — SUPERFICIAL PUNCTATE KERATITIS (SPK)
OD_SPK: T 1+
OS_SPK: T 1+
OD_SPK: T 1+
OS_SPK: T 1+
OD_SPK: T 1+

## 2021-01-01 ASSESSMENT — REFRACTION_AUTOREFRACTION
OS_SPHERE: -2.00
OD_CYLINDER: +3.75
OS_AXIS: 174
OD_SPHERE: -4.00
OS_CYLINDER: +0.50
OD_AXIS: 006

## 2021-01-01 ASSESSMENT — TONOMETRY
OS_IOP_MMHG: 10
OD_IOP_MMHG: 10

## 2022-01-01 ENCOUNTER — DOCTOR'S OFFICE (OUTPATIENT)
Dept: URBAN - NONMETROPOLITAN AREA CLINIC 1 | Facility: CLINIC | Age: 87
Setting detail: OPHTHALMOLOGY
End: 2022-01-01
Payer: MEDICARE

## 2022-01-01 ENCOUNTER — RX ONLY (RX ONLY)
Age: 87
End: 2022-01-01

## 2022-01-01 DIAGNOSIS — Z96.1: ICD-10-CM

## 2022-01-01 DIAGNOSIS — H35.3131: ICD-10-CM

## 2022-01-01 DIAGNOSIS — H53.2: ICD-10-CM

## 2022-01-01 DIAGNOSIS — H50.10: ICD-10-CM

## 2022-01-01 DIAGNOSIS — H35.3222: ICD-10-CM

## 2022-01-01 DIAGNOSIS — H49.12: ICD-10-CM

## 2022-01-01 DIAGNOSIS — H04.123: ICD-10-CM

## 2022-01-01 DIAGNOSIS — H47.013: ICD-10-CM

## 2022-01-01 PROCEDURE — 99213 OFFICE O/P EST LOW 20 MIN: CPT | Performed by: OPHTHALMOLOGY

## 2022-01-01 PROCEDURE — 67028 INJECTION EYE DRUG: CPT | Performed by: OPHTHALMOLOGY

## 2022-01-01 PROCEDURE — 92014 COMPRE OPH EXAM EST PT 1/>: CPT | Performed by: OPHTHALMOLOGY

## 2022-01-01 ASSESSMENT — SPHEQUIV_DERIVED
OD_SPHEQUIV: -2.125
OS_SPHEQUIV: -1.75
OS_SPHEQUIV: -1.75
OS_SPHEQUIV: -1
OD_SPHEQUIV: -2.125
OS_SPHEQUIV: -1
OS_SPHEQUIV: -1.75
OS_SPHEQUIV: -1
OS_SPHEQUIV: -1.75
OS_SPHEQUIV: -1
OD_SPHEQUIV: -2.125
OD_SPHEQUIV: -2.125
OS_SPHEQUIV: -1

## 2022-01-01 ASSESSMENT — REFRACTION_MANIFEST
OS_CYLINDER: -0.50
OD_SPHERE: PLANO
OD_AXIS: 095
OS_SPHERE: -0.75
OS_ADD: +3.25
OS_VA1: 20/50
OD_ADD: +3.25
OS_VA1: 20/50
OD_CYLINDER: -3.00
OD_VA2: 20/50-2
OD_SPHERE: PLANO
OS_ADD: +3.25
OD_SPHERE: PLANO
OS_ADD: +3.25
OS_SPHERE: -0.75
OS_VA2: 20/400
OD_AXIS: 095
OD_SPHERE: PLANO
OD_ADD: +3.25
OS_SPHERE: -0.75
OD_CYLINDER: -3.00
OU_VA: 20/50
OD_ADD: +3.25
OS_AXIS: 085
OS_SPHERE: -0.75
OS_ADD: +3.25
OS_AXIS: 085
OS_ADD: +3.25
OD_VA1: 20/40-1
OD_ADD: +3.25
OD_SPHERE: PLANO
OS_VA1: 20/400
OD_AXIS: 095
OD_CYLINDER: -3.00
OD_AXIS: 095
OD_CYLINDER: -3.00
OD_VA1: 20/50
OS_SPHERE: -0.75
OD_VA1: 20/50
OD_AXIS: 095
OD_SPHERE: PLANO
OS_VA1: 20/50
OD_CYLINDER: -3.00
OD_ADD: +3.25
OS_AXIS: 085
OD_ADD: +3.25
OD_VA1: 20/50-2
OD_VA1: 20/50
OU_VA: 20/50
OS_CYLINDER: -0.50
OS_VA1: 20/50+2
OD_CYLINDER: -3.00
OS_SPHERE: -0.75
OS_CYLINDER: -0.50
OD_ADD: +3.25
OS_VA2: 20/50+2
OD_AXIS: 095
OS_ADD: +3.25
OD_AXIS: 095
OD_VA1: 20/40-1
OS_VA2: 20/50+2
OS_AXIS: 085
OD_VA1: 20/50-2
OS_SPHERE: -0.75
OU_VA: 20/50
OS_SPHERE: -0.75
OS_CYLINDER: -0.50
OD_VA1: 20/50
OS_VA1: 20/50
OD_CYLINDER: -3.00
OU_VA: 20/50
OD_VA2: 20/40-1
OS_CYLINDER: -0.50
OS_CYLINDER: -0.50
OS_ADD: +3.25
OD_VA2: 20/50-2
OD_ADD: +3.25
OD_SPHERE: PLANO
OS_AXIS: 085
OS_AXIS: 085
OD_AXIS: 095
OD_SPHERE: PLANO
OS_VA2: 20/400
OS_CYLINDER: -0.50
OS_CYLINDER: -0.50
OS_VA1: 20/50+2
OS_ADD: +3.25
OS_VA1: 20/400
OD_VA2: 20/40-1
OD_CYLINDER: -3.00

## 2022-01-01 ASSESSMENT — REFRACTION_CURRENTRX
OD_VPRISM_DIRECTION: BF
OS_AXIS: 82
OD_CYLINDER: -3.00
OD_OVR_VA: 20/
OS_ADD: +3.50
OD_CYLINDER: -3.00
OS_ADD: +3.50
OS_SPHERE: -0.75
OD_SPHERE: PLANO
OD_VPRISM_DIRECTION: BF
OS_OVR_VA: 20/
OD_ADD: +3.50
OS_VPRISM_DIRECTION: BF
OD_AXIS: 99
OS_ADD: +3.50
OS_AXIS: 82
OS_OVR_VA: 20/
OD_OVR_VA: 20/
OD_CYLINDER: -3.00
OS_VPRISM_DIRECTION: BF
OD_OVR_VA: 20/
OS_OVR_VA: 20/
OD_SPHERE: PLANO
OD_ADD: +3.50
OD_CYLINDER: -3.00
OS_VPRISM_DIRECTION: BF
OS_SPHERE: -0.75
OD_SPHERE: PLANO
OS_SPHERE: -0.75
OD_AXIS: 99
OS_AXIS: 82
OD_ADD: +3.50
OD_SPHERE: PLANO
OD_VPRISM_DIRECTION: BF
OD_VPRISM_DIRECTION: BF
OS_SPHERE: -0.75
OS_OVR_VA: 20/
OS_AXIS: 82
OD_OVR_VA: 20/
OS_ADD: +3.50
OS_CYLINDER: -0.50
OS_VPRISM_DIRECTION: BF
OS_CYLINDER: -0.50
OD_AXIS: 99
OS_CYLINDER: -0.50
OD_AXIS: 99
OD_ADD: +3.50
OS_CYLINDER: -0.50

## 2022-01-01 ASSESSMENT — VISUAL ACUITY
OS_BCVA: 20/40-2
OD_BCVA: 20/400
OD_BCVA: 20/70
OD_BCVA: 20/400
OS_BCVA: 20/40-1
OS_BCVA: 20/40
OD_BCVA: 20/400
OS_BCVA: 20/40-2

## 2022-01-01 ASSESSMENT — CONFRONTATIONAL VISUAL FIELD TEST (CVF)
OS_FINDINGS: FULL
OD_FINDINGS: FULL
OD_FINDINGS: FULL
OS_FINDINGS: FULL
OD_FINDINGS: FULL
OS_FINDINGS: FULL
OS_FINDINGS: FULL
OD_FINDINGS: FULL

## 2022-01-01 ASSESSMENT — REFRACTION_AUTOREFRACTION
OD_CYLINDER: +3.75
OS_AXIS: 174
OD_SPHERE: -4.00
OD_AXIS: 006
OS_SPHERE: -2.00
OS_AXIS: 174
OS_CYLINDER: +0.50
OS_SPHERE: -2.00
OS_CYLINDER: +0.50
OS_SPHERE: -2.00
OD_CYLINDER: +3.75
OD_SPHERE: -4.00
OS_AXIS: 174
OD_CYLINDER: +3.75
OS_CYLINDER: +0.50
OD_AXIS: 006
OD_AXIS: 006
OD_SPHERE: -4.00
OS_SPHERE: -2.00
OD_SPHERE: -4.00
OS_AXIS: 174
OD_AXIS: 006
OD_CYLINDER: +3.75
OS_CYLINDER: +0.50

## 2022-01-01 ASSESSMENT — CORNEAL DYSTROPHY
OS_DYSTROPHY: MDF
OD_DYSTROPHY: MDF
OD_DYSTROPHY: MDF
OS_DYSTROPHY: MDF
OS_DYSTROPHY: MDF
OD_DYSTROPHY: MDF

## 2022-01-01 ASSESSMENT — SUPERFICIAL PUNCTATE KERATITIS (SPK)
OD_SPK: T 1+
OS_SPK: T 1+

## 2022-01-01 ASSESSMENT — DRY EYES - PHYSICIAN NOTES
OS_GENERALCOMMENTS: KSICCA
OD_GENERALCOMMENTS: KSICCA
OD_GENERALCOMMENTS: KSICCA
OS_GENERALCOMMENTS: KSICCA
OS_GENERALCOMMENTS: KSICCA
OD_GENERALCOMMENTS: KSICCA

## 2022-01-01 ASSESSMENT — PUNCTA - ASSESSMENT
OD_PUNCTA: SIL PLUG RLL
OS_PUNCTA: SIL PLUG LLL

## 2022-02-15 PROBLEM — H53.2 DIPLOPIA: Status: ACTIVE | Noted: 2018-09-25

## 2022-02-15 PROBLEM — H02.403: Status: ACTIVE | Noted: 2017-06-27

## 2022-02-15 PROBLEM — H52.03 HYPEROPIA, ASTIGMATISM, PRESBYOPIA OU: Status: ACTIVE | Noted: 2019-03-27

## 2022-02-15 PROBLEM — H49.12: Status: ACTIVE | Noted: 2021-01-01

## 2022-02-15 PROBLEM — H04.121 DRY EYE; RIGHT EYE, LEFT EYE: Status: ACTIVE | Noted: 2022-01-01

## 2022-02-15 PROBLEM — H52.4 HYPEROPIA, ASTIGMATISM, PRESBYOPIA OU: Status: ACTIVE | Noted: 2019-03-27

## 2022-02-15 PROBLEM — H50.10 EXOTROPIA, UNSPECIFIED: Status: ACTIVE | Noted: 2021-01-01

## 2022-02-15 PROBLEM — H52.223 HYPEROPIA, ASTIGMATISM, PRESBYOPIA OU: Status: ACTIVE | Noted: 2019-03-27

## 2022-02-15 PROBLEM — H04.122 DRY EYE; RIGHT EYE, LEFT EYE: Status: ACTIVE | Noted: 2022-01-01

## 2022-02-15 PROBLEM — H47.013 ISCHEMIC OPTIC NEUROPATHY; BOTH EYES: Status: ACTIVE | Noted: 2020-08-19

## 2022-03-07 ENCOUNTER — HOSPITAL ENCOUNTER (EMERGENCY)
Facility: HOSPITAL | Age: 87
Discharge: HOME | End: 2022-03-07
Attending: EMERGENCY MEDICINE | Admitting: EMERGENCY MEDICINE
Payer: MEDICARE

## 2022-03-07 ENCOUNTER — APPOINTMENT (EMERGENCY)
Dept: RADIOLOGY | Facility: HOSPITAL | Age: 87
End: 2022-03-07
Payer: MEDICARE

## 2022-03-07 VITALS
RESPIRATION RATE: 20 BRPM | WEIGHT: 99.21 LBS | BODY MASS INDEX: 21.47 KG/M2 | TEMPERATURE: 98.8 F | OXYGEN SATURATION: 94 % | SYSTOLIC BLOOD PRESSURE: 145 MMHG | DIASTOLIC BLOOD PRESSURE: 65 MMHG | HEART RATE: 70 BPM

## 2022-03-07 DIAGNOSIS — R11.0 NAUSEA: Primary | ICD-10-CM

## 2022-03-07 DIAGNOSIS — J18.9 PNEUMONIA OF LEFT LOWER LOBE DUE TO INFECTIOUS ORGANISM: ICD-10-CM

## 2022-03-07 DIAGNOSIS — U07.1 COVID-19 VIRUS INFECTION: ICD-10-CM

## 2022-03-07 LAB
ALBUMIN SERPL BCP-MCNC: 3.4 G/DL (ref 3.5–5)
ALP SERPL-CCNC: 93 U/L (ref 46–116)
ALT SERPL W P-5'-P-CCNC: 16 U/L (ref 12–78)
ANION GAP SERPL CALCULATED.3IONS-SCNC: 5 MMOL/L (ref 4–13)
APTT PPP: 30 SECONDS (ref 23–37)
AST SERPL W P-5'-P-CCNC: 25 U/L (ref 5–45)
ATRIAL RATE: 416 BPM
BASOPHILS # BLD AUTO: 0.01 THOUSANDS/ΜL (ref 0–0.1)
BASOPHILS NFR BLD AUTO: 0 % (ref 0–1)
BILIRUB SERPL-MCNC: 0.37 MG/DL (ref 0.2–1)
BILIRUB UR QL STRIP: NEGATIVE
BUN SERPL-MCNC: 30 MG/DL (ref 5–25)
CALCIUM ALBUM COR SERPL-MCNC: 10.2 MG/DL (ref 8.3–10.1)
CALCIUM SERPL-MCNC: 9.7 MG/DL (ref 8.3–10.1)
CHLORIDE SERPL-SCNC: 99 MMOL/L (ref 100–108)
CLARITY UR: CLEAR
CO2 SERPL-SCNC: 32 MMOL/L (ref 21–32)
COLOR UR: YELLOW
CREAT SERPL-MCNC: 1.27 MG/DL (ref 0.6–1.3)
EOSINOPHIL # BLD AUTO: 0 THOUSAND/ΜL (ref 0–0.61)
EOSINOPHIL NFR BLD AUTO: 0 % (ref 0–6)
ERYTHROCYTE [DISTWIDTH] IN BLOOD BY AUTOMATED COUNT: 14.6 % (ref 11.6–15.1)
FLUAV RNA RESP QL NAA+PROBE: NEGATIVE
FLUBV RNA RESP QL NAA+PROBE: NEGATIVE
GFR SERPL CREATININE-BSD FRML MDRD: 33 ML/MIN/1.73SQ M
GLUCOSE SERPL-MCNC: 146 MG/DL (ref 65–140)
GLUCOSE UR STRIP-MCNC: NEGATIVE MG/DL
HCT VFR BLD AUTO: 36.9 % (ref 34.8–46.1)
HGB BLD-MCNC: 11.6 G/DL (ref 11.5–15.4)
HGB UR QL STRIP.AUTO: NEGATIVE
IMM GRANULOCYTES # BLD AUTO: 0.03 THOUSAND/UL (ref 0–0.2)
IMM GRANULOCYTES NFR BLD AUTO: 1 % (ref 0–2)
INR PPP: 0.93 (ref 0.84–1.19)
KETONES UR STRIP-MCNC: ABNORMAL MG/DL
LACTATE SERPL-SCNC: 2 MMOL/L (ref 0.5–2)
LEUKOCYTE ESTERASE UR QL STRIP: NEGATIVE
LIPASE SERPL-CCNC: 76 U/L (ref 73–393)
LYMPHOCYTES # BLD AUTO: 0.73 THOUSANDS/ΜL (ref 0.6–4.47)
LYMPHOCYTES NFR BLD AUTO: 17 % (ref 14–44)
MCH RBC QN AUTO: 32.3 PG (ref 26.8–34.3)
MCHC RBC AUTO-ENTMCNC: 31.4 G/DL (ref 31.4–37.4)
MCV RBC AUTO: 103 FL (ref 82–98)
MONOCYTES # BLD AUTO: 0.47 THOUSAND/ΜL (ref 0.17–1.22)
MONOCYTES NFR BLD AUTO: 11 % (ref 4–12)
NEUTROPHILS # BLD AUTO: 3.19 THOUSANDS/ΜL (ref 1.85–7.62)
NEUTS SEG NFR BLD AUTO: 71 % (ref 43–75)
NITRITE UR QL STRIP: NEGATIVE
NRBC BLD AUTO-RTO: 0 /100 WBCS
PH UR STRIP.AUTO: 7 [PH]
PLATELET # BLD AUTO: 186 THOUSANDS/UL (ref 149–390)
PMV BLD AUTO: 10.6 FL (ref 8.9–12.7)
POTASSIUM SERPL-SCNC: 4.1 MMOL/L (ref 3.5–5.3)
PROCALCITONIN SERPL-MCNC: 0.09 NG/ML
PROT SERPL-MCNC: 7.8 G/DL (ref 6.4–8.2)
PROT UR STRIP-MCNC: NEGATIVE MG/DL
PROTHROMBIN TIME: 12.4 SECONDS (ref 11.6–14.5)
QRS AXIS: -78 DEGREES
QRSD INTERVAL: 174 MS
QT INTERVAL: 430 MS
QTC INTERVAL: 473 MS
RBC # BLD AUTO: 3.59 MILLION/UL (ref 3.81–5.12)
RSV RNA RESP QL NAA+PROBE: NEGATIVE
SARS-COV-2 RNA RESP QL NAA+PROBE: POSITIVE
SODIUM SERPL-SCNC: 136 MMOL/L (ref 136–145)
SP GR UR STRIP.AUTO: 1.01 (ref 1–1.03)
T WAVE AXIS: 96 DEGREES
UROBILINOGEN UR QL STRIP.AUTO: 0.2 E.U./DL
VENTRICULAR RATE: 73 BPM
WBC # BLD AUTO: 4.43 THOUSAND/UL (ref 4.31–10.16)

## 2022-03-07 PROCEDURE — 81003 URINALYSIS AUTO W/O SCOPE: CPT | Performed by: EMERGENCY MEDICINE

## 2022-03-07 PROCEDURE — 83690 ASSAY OF LIPASE: CPT | Performed by: EMERGENCY MEDICINE

## 2022-03-07 PROCEDURE — 96365 THER/PROPH/DIAG IV INF INIT: CPT

## 2022-03-07 PROCEDURE — 0241U HB NFCT DS VIR RESP RNA 4 TRGT: CPT | Performed by: EMERGENCY MEDICINE

## 2022-03-07 PROCEDURE — 99284 EMERGENCY DEPT VISIT MOD MDM: CPT

## 2022-03-07 PROCEDURE — 83605 ASSAY OF LACTIC ACID: CPT | Performed by: EMERGENCY MEDICINE

## 2022-03-07 PROCEDURE — 85025 COMPLETE CBC W/AUTO DIFF WBC: CPT | Performed by: EMERGENCY MEDICINE

## 2022-03-07 PROCEDURE — 71045 X-RAY EXAM CHEST 1 VIEW: CPT

## 2022-03-07 PROCEDURE — 96375 TX/PRO/DX INJ NEW DRUG ADDON: CPT

## 2022-03-07 PROCEDURE — 87040 BLOOD CULTURE FOR BACTERIA: CPT | Performed by: EMERGENCY MEDICINE

## 2022-03-07 PROCEDURE — 85610 PROTHROMBIN TIME: CPT | Performed by: EMERGENCY MEDICINE

## 2022-03-07 PROCEDURE — 84145 PROCALCITONIN (PCT): CPT | Performed by: EMERGENCY MEDICINE

## 2022-03-07 PROCEDURE — 36415 COLL VENOUS BLD VENIPUNCTURE: CPT | Performed by: EMERGENCY MEDICINE

## 2022-03-07 PROCEDURE — 85730 THROMBOPLASTIN TIME PARTIAL: CPT | Performed by: EMERGENCY MEDICINE

## 2022-03-07 PROCEDURE — 96361 HYDRATE IV INFUSION ADD-ON: CPT

## 2022-03-07 PROCEDURE — 99285 EMERGENCY DEPT VISIT HI MDM: CPT | Performed by: EMERGENCY MEDICINE

## 2022-03-07 PROCEDURE — 93005 ELECTROCARDIOGRAM TRACING: CPT

## 2022-03-07 PROCEDURE — 80053 COMPREHEN METABOLIC PANEL: CPT | Performed by: EMERGENCY MEDICINE

## 2022-03-07 RX ORDER — METOCLOPRAMIDE HYDROCHLORIDE 5 MG/ML
10 INJECTION INTRAMUSCULAR; INTRAVENOUS ONCE
Status: DISCONTINUED | OUTPATIENT
Start: 2022-03-07 | End: 2022-03-07 | Stop reason: HOSPADM

## 2022-03-07 RX ORDER — ONDANSETRON 2 MG/ML
4 INJECTION INTRAMUSCULAR; INTRAVENOUS ONCE
Status: COMPLETED | OUTPATIENT
Start: 2022-03-07 | End: 2022-03-07

## 2022-03-07 RX ORDER — CEFDINIR 300 MG/1
300 CAPSULE ORAL EVERY 12 HOURS SCHEDULED
Qty: 14 CAPSULE | Refills: 0 | Status: SHIPPED | OUTPATIENT
Start: 2022-03-07 | End: 2022-03-14

## 2022-03-07 RX ORDER — ONDANSETRON 4 MG/1
4 TABLET, FILM COATED ORAL EVERY 6 HOURS
Qty: 12 TABLET | Refills: 0 | Status: SHIPPED | OUTPATIENT
Start: 2022-03-07

## 2022-03-07 RX ORDER — ONDANSETRON 2 MG/ML
4 INJECTION INTRAMUSCULAR; INTRAVENOUS ONCE
Status: DISCONTINUED | OUTPATIENT
Start: 2022-03-07 | End: 2022-03-07 | Stop reason: HOSPADM

## 2022-03-07 RX ORDER — DICYCLOMINE HCL 20 MG
20 TABLET ORAL ONCE
Status: COMPLETED | OUTPATIENT
Start: 2022-03-07 | End: 2022-03-07

## 2022-03-07 RX ORDER — CEFTRIAXONE 1 G/50ML
1000 INJECTION, SOLUTION INTRAVENOUS ONCE
Status: COMPLETED | OUTPATIENT
Start: 2022-03-07 | End: 2022-03-07

## 2022-03-07 RX ORDER — DIPHENHYDRAMINE HYDROCHLORIDE 50 MG/ML
12.5 INJECTION INTRAMUSCULAR; INTRAVENOUS ONCE
Status: DISCONTINUED | OUTPATIENT
Start: 2022-03-07 | End: 2022-03-07 | Stop reason: HOSPADM

## 2022-03-07 RX ADMIN — ONDANSETRON 4 MG: 2 INJECTION INTRAMUSCULAR; INTRAVENOUS at 12:33

## 2022-03-07 RX ADMIN — CEFTRIAXONE 1000 MG: 1 INJECTION, SOLUTION INTRAVENOUS at 13:31

## 2022-03-07 RX ADMIN — DICYCLOMINE HYDROCHLORIDE 20 MG: 20 TABLET ORAL at 15:28

## 2022-03-07 RX ADMIN — SODIUM CHLORIDE 1000 ML: 0.9 INJECTION, SOLUTION INTRAVENOUS at 12:33

## 2022-03-07 NOTE — ED NOTES
Pt now c/o continued nausea and abdominal pain, provider made aware      Mauricio Santiago RN  03/07/22 3054

## 2022-03-07 NOTE — ED PROVIDER NOTES
History  Chief Complaint   Patient presents with    Diarrhea     + covid, weakness  Seen at hospital for same yesterday  Patient is from [facility]  Has not been feeling well for the past few days  Complaining of abdominal pain  Has nausea but no vomiting  Has some diarrhea  Normally wears 2 L nasal cannula at the nursing home  Also complains of shortness of breath with slight cough  COVID positive chest 2 weeks ago via antigen test   Had blood work done yesterday  Had a normal white blood cell count  Lipase was slightly elevated at 175  CT scan showed pneumonia  Was sent home on Levaquin  Not any better  History provided by:  Patient and EMS personnel   used: No    Nausea  The primary symptoms include nausea, diarrhea and myalgias  Primary symptoms do not include fever, abdominal pain, vomiting, melena, dysuria or rash  The illness is also significant for anorexia  The illness does not include chills or constipation  Prior to Admission Medications   Prescriptions Last Dose Informant Patient Reported? Taking?    Cholecalciferol (VITAMIN D-3) 125 MCG (5000 UT) TABS   Yes No   Sig: Take by mouth   amLODIPine (NORVASC) 2 5 mg tablet   Yes No   Sig: Take 2 5 mg by mouth daily   amitriptyline (ELAVIL) 10 mg tablet   Yes No   Sig: Take 20 mg by mouth   aspirin (Aspir-Low) 81 mg EC tablet   Yes No   Sig: Take by mouth   bisacodyl (DULCOLAX) 5 mg EC tablet   Yes No   Sig: Take 5 mg by mouth   cyproheptadine (PERIACTIN) 4 mg tablet   Yes No   Sig: Take 4 mg by mouth 2 (two) times a day   famotidine (PEPCID) 20 mg tablet   Yes No   furosemide (LASIX) 40 mg tablet   Yes No   Sig: Take by mouth   melatonin 1 mg   Yes No   Sig: Take 4 mg by mouth   metoprolol succinate (TOPROL-XL) 50 mg 24 hr tablet   Yes No   Sig: Take by mouth   polyethylene glycol-propylene glycol (SYSTANE) 0 4-0 3 %   Yes No   Sig: Apply to eye 4 (four) times a day as needed   senna 176 mg/5 mL Yes No   Sig: Take by mouth      Facility-Administered Medications: None       Past Medical History:   Diagnosis Date    A-fib ([place] [address] )     ASCVD (arteriosclerotic cardiovascular disease)     Coronary artery disease     Hypertension     PVD (peripheral vascular disease) ([place] [address] )        History reviewed  No pertinent surgical history  History reviewed  No pertinent family history  I have reviewed and agree with the history as documented  E-Cigarette/Vaping    E-Cigarette Use Never User      E-Cigarette/Vaping Substances     Social History     Tobacco Use    Smoking status: Never Smoker    Smokeless tobacco: Never Used   Vaping Use    Vaping Use: Never used   Substance Use Topics    Alcohol use: Never    Drug use: Never       Review of Systems   Constitutional: Negative for chills and fever  HENT: Negative for ear pain, hearing loss, sore throat, trouble swallowing and voice change  Eyes: Negative for pain and discharge  Respiratory: Positive for cough and shortness of breath  Negative for wheezing  Cardiovascular: Negative for chest pain and palpitations  Gastrointestinal: Positive for anorexia, diarrhea and nausea  Negative for abdominal pain, blood in stool, constipation, melena and vomiting  Genitourinary: Negative for dysuria, flank pain, frequency and hematuria  Musculoskeletal: Positive for myalgias  Negative for joint swelling, neck pain and neck stiffness  Skin: Negative for rash and wound  Neurological: Negative for dizziness, seizures, syncope, facial asymmetry and headaches  Psychiatric/Behavioral: Negative for hallucinations, self-injury and suicidal ideas  All other systems reviewed and are negative  Physical Exam  Physical Exam  Vitals and nursing note reviewed  Constitutional:       General: She is not in acute distress  Appearance: She is well-developed  HENT:      Head: Normocephalic and atraumatic        Right Ear: External ear normal       Left Ear: External ear normal    Eyes:      General: No scleral icterus  Right eye: No discharge  Left eye: No discharge  Extraocular Movements: Extraocular movements intact  Conjunctiva/sclera: Conjunctivae normal    Cardiovascular:      Rate and Rhythm: Normal rate and regular rhythm  Heart sounds: Normal heart sounds  No murmur heard  Pulmonary:      Effort: Pulmonary effort is normal       Breath sounds: Normal breath sounds  No wheezing or rales  Abdominal:      General: Bowel sounds are normal  There is no distension  Palpations: Abdomen is soft  Tenderness: There is no abdominal tenderness  There is no guarding or rebound  Musculoskeletal:         General: No deformity  Normal range of motion  Cervical back: Normal range of motion and neck supple  Skin:     General: Skin is warm and dry  Findings: No rash  Neurological:      General: No focal deficit present  Mental Status: She is alert and oriented to person, place, and time  Cranial Nerves: No cranial nerve deficit  Psychiatric:         Mood and Affect: Mood normal          Behavior: Behavior normal          Thought Content:  Thought content normal          Judgment: Judgment normal          Vital Signs  ED Triage Vitals [03/07/22 1208]   Temperature Pulse Respirations Blood Pressure SpO2   98 8 °F (37 1 °C) 85 20 133/73 (!) 86 %      Temp Source Heart Rate Source Patient Position - Orthostatic VS BP Location FiO2 (%)   Temporal Monitor Lying Right arm --      Pain Score       4           Vitals:    03/07/22 1208   BP: 133/73   Pulse: 85   Patient Position - Orthostatic VS: Lying         Visual Acuity      ED Medications  Medications   sodium chloride 0 9 % bolus 1,000 mL (1,000 mL Intravenous New Bag 3/7/22 1233)   cefTRIAXone (ROCEPHIN) IVPB (premix in dextrose) 1,000 mg 50 mL (has no administration in time range)   ondansetron (ZOFRAN) injection 4 mg (4 mg Intravenous Given 3/7/22 1233) Diagnostic Studies  Results Reviewed     Procedure Component Value Units Date/Time    COVID/FLU/RSV - 2 hour TAT [[id number]]  (Abnormal) Collected: 03/07/22 1220    Lab Status: Final result Specimen: Nares from Nasopharyngeal Swab Updated: 03/07/22 1322     SARS-CoV-2 Positive     INFLUENZA A PCR Negative     INFLUENZA B PCR Negative     RSV PCR Negative    Narrative:      FOR PEDIATRIC PATIENTS - copy/paste COVID Guidelines URL to browser: [email or web address]  "[employer]x    SARS-CoV-2 assay is a Nucleic Acid Amplification assay intended for the  qualitative detection of nucleic acid from SARS-CoV-2 in nasopharyngeal  swabs  Results are for the presumptive identification of SARS-CoV-2 RNA  Positive results are indicative of infection with SARS-CoV-2, the virus  causing COVID-19, but do not rule out bacterial infection or co-infection  with other viruses  Laboratories within the [place] and its  territories are required to report all positive results to the appropriate  public health authorities  Negative results do not preclude SARS-CoV-2  infection and should not be used as the sole basis for treatment or other  patient management decisions  Negative results must be combined with  clinical observations, patient history, and epidemiological information  This test has not been FDA cleared or approved  This test has been authorized by FDA under an Emergency Use Authorization  (EUA)  This test is only authorized for the duration of time the  declaration that circumstances exist justifying the authorization of the  emergency use of an in vitro diagnostic tests for detection of SARS-CoV-2  virus and/or diagnosis of COVID-19 infection under section 564(b)(1) of  the Act, 21 U  S C  041AIJ-8(S)(8), unless the authorization is terminated  or revoked sooner  The test has been validated but independent review by FDA  and CLIA is pending      Test performed using [employer] GeneXpert: This RT-PCR assay targets N2,  a region unique to SARS-CoV-2  A conserved region in the E-gene was chosen  for pan-Sarbecovirus detection which includes SARS-CoV-2  Procalcitonin with AM Reflex [[id number]]  (Normal) Collected: 03/07/22 1219    Lab Status: Final result Specimen: Blood from Arm, Right Updated: 03/07/22 1257     Procalcitonin 0 09 ng/ml     Lactic acid [[id number]]  (Normal) Collected: 03/07/22 1219    Lab Status: Final result Specimen: Blood from Arm, Right Updated: 03/07/22 1252     LACTIC ACID 2 0 mmol/L     Narrative:      Result may be elevated if tourniquet was used during collection      Comprehensive metabolic panel [[id number]]  (Abnormal) Collected: 03/07/22 1219    Lab Status: Final result Specimen: Blood from Arm, Right Updated: 03/07/22 1247     Sodium 136 mmol/L      Potassium 4 1 mmol/L      Chloride 99 mmol/L      CO2 32 mmol/L      ANION GAP 5 mmol/L      BUN 30 mg/dL      Creatinine 1 27 mg/dL      Glucose 146 mg/dL      Calcium 9 7 mg/dL      Corrected Calcium 10 2 mg/dL      AST 25 U/L      ALT 16 U/L      Alkaline Phosphatase 93 U/L      Total Protein 7 8 g/dL      Albumin 3 4 g/dL      Total Bilirubin 0 37 mg/dL      eGFR 33 ml/min/1 73sq m     Narrative:      Carline guidelines for Chronic Kidney Disease (CKD):     Stage 1 with normal or high GFR (GFR > 90 mL/min/1 73 square meters)    Stage 2 Mild CKD (GFR = 60-89 mL/min/1 73 square meters)    Stage 3A Moderate CKD (GFR = 45-59 mL/min/1 73 square meters)    Stage 3B Moderate CKD (GFR = 30-44 mL/min/1 73 square meters)    Stage 4 Severe CKD (GFR = 15-29 mL/min/1 73 square meters)    Stage 5 End Stage CKD (GFR <15 mL/min/1 73 square meters)  Note: GFR calculation is accurate only with a steady state creatinine    Lipase [[id number]]  (Normal) Collected: 03/07/22 1219    Lab Status: Final result Specimen: Blood from Arm, Right Updated: 03/07/22 1247     Lipase 76 u/L Protime-INR [[id number]]  (Normal) Collected: 03/07/22 1219    Lab Status: Final result Specimen: Blood from Arm, Right Updated: 03/07/22 1243     Protime 12 4 seconds      INR 0 93    APTT [[id number]]  (Normal) Collected: 03/07/22 1219    Lab Status: Final result Specimen: Blood from Arm, Right Updated: 03/07/22 1243     PTT 30 seconds     UA w Reflex to Microscopic w Reflex to Culture [[id number]]  (Abnormal) Collected: 03/07/22 1233    Lab Status: Final result Specimen: Urine, Straight Cath Updated: 03/07/22 1241     Color, UA Yellow     Clarity, UA Clear     Specific Gravity, UA 1 015     pH, UA 7 0     Leukocytes, UA Negative     Nitrite, UA Negative     Protein, UA Negative mg/dl      Glucose, UA Negative mg/dl      Ketones, UA Trace mg/dl      Urobilinogen, UA 0 2 E U /dl      Bilirubin, UA Negative     Blood, UA Negative    CBC and differential [[id number]]  (Abnormal) Collected: 03/07/22 1219    Lab Status: Final result Specimen: Blood from Arm, Right Updated: 03/07/22 1229     WBC 4 43 Thousand/uL      RBC 3 59 Million/uL      Hemoglobin 11 6 g/dL      Hematocrit 36 9 %       fL      MCH 32 3 pg      MCHC 31 4 g/dL      RDW 14 6 %      MPV 10 6 fL      Platelets 524 Thousands/uL      nRBC 0 /100 WBCs      Neutrophils Relative 71 %      Immat GRANS % 1 %      Lymphocytes Relative 17 %      Monocytes Relative 11 %      Eosinophils Relative 0 %      Basophils Relative 0 %      Neutrophils Absolute 3 19 Thousands/µL      Immature Grans Absolute 0 03 Thousand/uL      Lymphocytes Absolute 0 73 Thousands/µL      Monocytes Absolute 0 47 Thousand/µL      Eosinophils Absolute 0 00 Thousand/µL      Basophils Absolute 0 01 Thousands/µL     Blood culture #2 [[id number]] Collected: 03/07/22 1219    Lab Status:  In process Specimen: Blood from Arm, Right Updated: 03/07/22 1225    Blood culture #1 [[id number]]     Lab Status: No result Specimen: Blood                  XR chest 1 view portable    (Results Pending) Procedures  ECG 12 Lead Documentation Only    Date/Time: 3/7/2022 12:21 PM  Performed by: Tim Bhandari MD  Authorized by: Tim Bhandari MD     ECG reviewed by me, the ED Provider: yes    Patient location:  ED  Previous ECG:     Previous ECG:  Unavailable  Rate:     ECG rate:  70  Rhythm:     Rhythm: paced    Pacing:     Capture:  Complete    Type of pacing:  Ventricular             ED Course  ED Course as of 03/07/22 Xiang Bauman 100 Mar 07, 2022   1215 Patient is awake and alert  Answering questions appropriately  The patient asked about treatment measures  Not on her nursing home records there is a paper that says comfort measures only with no IV antibiotics or IV fluids  Patient wishes to have treatment with IV antibiotics or IV fluids if necessary  I also discussed with the patient's daughter, Gypsy Liz  She states she has no knowledge of the comfort care measures only  States the patient is a D and are  However, admission and IV antibiotics and IV fluids are allowed  1233 Chest x-ray with left-sided infiltrate  1256 Initial pulse ox of 86% was on room air  Patient is normally on 2 L nasal cannula  When the 2 L was placed her pulse ox normalized to the mid 90s  0 Discussed with daughter about lab work and treatment plan  Agreeable to treatment plan with outpatient antibiotics  1314 The patient did well with her p o  Challenge  States she felt fine  When told that she would be discharged the patient then started complaining of her stomach hurting in being nauseated again  On exam the patient's abdomen is benign  No need for repeat CT scan at this time  White blood cell count is unchanged from yesterday  SBIRT 22yo+      Most Recent Value   SBIRT (22 yo +)    In order to provide better care to our patients, we are screening all of our patients for alcohol and drug use  Would it be okay to ask you these screening questions?  Yes Filed at: 03/07/2022 1211 Initial Alcohol Screen: US AUDIT-C     1  How often do you have a drink containing alcohol? 0 Filed at: 03/07/2022 1211   2  How many drinks containing alcohol do you have on a typical day you are drinking? 0 Filed at: 03/07/2022 1211   3a  Male UNDER 65: How often do you have five or more drinks on one occasion? 0 Filed at: 03/07/2022 1211   3b  FEMALE Any Age, or MALE 65+: How often do you have 4 or more drinks on one occassion? 0 Filed at: 03/07/2022 1211   Audit-C Score 0 Filed at: 03/07/2022 1211   MAE: How many times in the past year have you    Used an illegal drug or used a prescription medication for non-medical reasons? Never Filed at: 03/07/2022 1211                    MDM  Number of Diagnoses or Management Options     Amount and/or Complexity of Data Reviewed  Clinical lab tests: reviewed  Review and summarize past medical records: yes        Disposition  Final diagnoses:   Nausea   Pneumonia of left lower lobe due to infectious organism   COVID-19 virus infection     Time reflects when diagnosis was documented in both MDM as applicable and the Disposition within this note     Time User Action Codes Description Comment    3/7/2022  1:11 PM Matteo Rao Add [R11 0] Nausea     3/7/2022  1:11 PM Matteo Rao Add [J18 9] Pneumonia of left lower lobe due to infectious organism     3/7/2022  1:23 PM Ekaterina Rao Add [U07 1] COVID-19 virus infection       ED Disposition     ED Disposition Condition Date/Time Comment    Discharge Stable Mon Mar 7, 2022  1:11 PM Kaitlynn April discharge to home/self care              Follow-up Information    None         Patient's Medications   Discharge Prescriptions    CEFDINIR (OMNICEF) 300 MG CAPSULE    Take 1 capsule (300 mg total) by mouth every 12 (twelve) hours for 7 days       Start Date: 3/7/2022  End Date: 3/14/2022       Order Dose: 300 mg       Quantity: 14 capsule    Refills: 0    ONDANSETRON (ZOFRAN) 4 MG TABLET    Take 1 tablet (4 mg total) by mouth every 6 (six) hours       Start Date: 3/7/2022  End Date: --       Order Dose: 4 mg       Quantity: 12 tablet    Refills: 0       No discharge procedures on file      PDMP Review     None          ED Provider  Electronically Signed by           Cora Martinez MD  03/07/22 6607

## 2022-03-07 NOTE — DISCHARGE INSTRUCTIONS
Your CT scan from yesterday showed no pancreatitis  Your blood work from today shows no evidence of pancreatitis

## 2022-03-12 LAB
BACTERIA BLD CULT: NORMAL
BACTERIA BLD CULT: NORMAL